# Patient Record
Sex: FEMALE | Race: WHITE | NOT HISPANIC OR LATINO
[De-identification: names, ages, dates, MRNs, and addresses within clinical notes are randomized per-mention and may not be internally consistent; named-entity substitution may affect disease eponyms.]

---

## 2020-10-05 ENCOUNTER — RECORDS - HEALTHEAST (OUTPATIENT)
Dept: ADMINISTRATIVE | Facility: OTHER | Age: 75
End: 2020-10-05

## 2020-10-07 ENCOUNTER — HOSPITAL ENCOUNTER (OUTPATIENT)
Dept: ULTRASOUND IMAGING | Facility: CLINIC | Age: 75
Discharge: HOME | End: 2020-10-07
Attending: PHYSICIAN ASSISTANT

## 2020-10-07 DIAGNOSIS — M79.89 RIGHT LEG SWELLING: ICD-10-CM

## 2021-03-25 ENCOUNTER — RECORDS - HEALTHEAST (OUTPATIENT)
Dept: ADMINISTRATIVE | Facility: OTHER | Age: 76
End: 2021-03-25

## 2021-04-08 ENCOUNTER — HOSPITAL ENCOUNTER (OUTPATIENT)
Dept: CT IMAGING | Facility: CLINIC | Age: 76
Discharge: HOME | End: 2021-04-08
Attending: INTERNAL MEDICINE

## 2021-04-08 DIAGNOSIS — R10.9 ABDOMINAL PAIN: ICD-10-CM

## 2021-04-08 LAB
CREAT BLD-MCNC: 1.2 MG/DL (ref 0.6–1.1)
GFR SERPL CREATININE-BSD FRML MDRD: 44 ML/MIN/1.73M2

## 2021-04-16 ENCOUNTER — RECORDS - HEALTHEAST (OUTPATIENT)
Dept: ADMINISTRATIVE | Facility: OTHER | Age: 76
End: 2021-04-16

## 2021-04-19 ENCOUNTER — HOSPITAL ENCOUNTER (OUTPATIENT)
Dept: ULTRASOUND IMAGING | Facility: CLINIC | Age: 76
Discharge: HOME | End: 2021-04-19

## 2021-04-19 DIAGNOSIS — N83.8 OVARIAN MASS: ICD-10-CM

## 2021-04-20 ENCOUNTER — HOSPITAL ENCOUNTER (OUTPATIENT)
Dept: MRI IMAGING | Facility: CLINIC | Age: 76
Discharge: HOME | End: 2021-04-20

## 2021-04-20 DIAGNOSIS — R93.5 ABNORMAL CT OF THE ABDOMEN: ICD-10-CM

## 2021-04-20 ASSESSMENT — MIFFLIN-ST. JEOR: SCORE: 1305.08

## 2021-05-15 ENCOUNTER — HEALTH MAINTENANCE LETTER (OUTPATIENT)
Age: 76
End: 2021-05-15

## 2021-05-28 ENCOUNTER — RECORDS - HEALTHEAST (OUTPATIENT)
Dept: ADMINISTRATIVE | Facility: CLINIC | Age: 76
End: 2021-05-28

## 2021-05-31 ENCOUNTER — RECORDS - HEALTHEAST (OUTPATIENT)
Dept: ADMINISTRATIVE | Facility: CLINIC | Age: 76
End: 2021-05-31

## 2021-06-05 VITALS — WEIGHT: 190 LBS | BODY MASS INDEX: 34.96 KG/M2 | HEIGHT: 62 IN

## 2021-09-04 ENCOUNTER — HEALTH MAINTENANCE LETTER (OUTPATIENT)
Age: 76
End: 2021-09-04

## 2022-06-11 ENCOUNTER — HEALTH MAINTENANCE LETTER (OUTPATIENT)
Age: 77
End: 2022-06-11

## 2022-10-22 ENCOUNTER — HEALTH MAINTENANCE LETTER (OUTPATIENT)
Age: 77
End: 2022-10-22

## 2023-06-18 ENCOUNTER — HEALTH MAINTENANCE LETTER (OUTPATIENT)
Age: 78
End: 2023-06-18

## 2024-11-18 RX ORDER — COLCHICINE 0.6 MG/1
0.6 TABLET ORAL DAILY
Status: ON HOLD | COMMUNITY
End: 2025-02-25

## 2024-11-18 RX ORDER — PREDNISOLONE 5 MG/1
5 TABLET ORAL DAILY
Status: ON HOLD | COMMUNITY
End: 2025-02-25

## 2024-11-18 RX ORDER — FUROSEMIDE 20 MG/1
20 TABLET ORAL DAILY
Status: ON HOLD | COMMUNITY
End: 2025-02-25

## 2024-11-18 RX ORDER — ATORVASTATIN CALCIUM 20 MG/1
20 TABLET, FILM COATED ORAL DAILY
COMMUNITY
End: 2025-02-20

## 2024-11-18 RX ORDER — FLUTICASONE PROPIONATE 50 MCG
2 SPRAY, SUSPENSION (ML) NASAL DAILY
Status: ON HOLD | COMMUNITY

## 2024-11-18 RX ORDER — POTASSIUM CHLORIDE 750 MG/1
10 CAPSULE, EXTENDED RELEASE ORAL 3 TIMES DAILY
Status: ON HOLD | COMMUNITY

## 2024-11-18 RX ORDER — PHENAZOPYRIDINE HYDROCHLORIDE 100 MG/1
100 TABLET, FILM COATED ORAL 3 TIMES DAILY PRN
Status: ON HOLD | COMMUNITY
End: 2025-02-25

## 2024-11-18 RX ORDER — NICOTINE POLACRILEX 4 MG/1
20 GUM, CHEWING ORAL 2 TIMES DAILY
Status: ON HOLD | COMMUNITY

## 2024-11-18 RX ORDER — LOSARTAN POTASSIUM AND HYDROCHLOROTHIAZIDE 12.5; 5 MG/1; MG/1
1 TABLET ORAL DAILY
Status: ON HOLD | COMMUNITY

## 2024-11-18 RX ORDER — ALLOPURINOL 100 MG/1
100 TABLET ORAL 2 TIMES DAILY
Status: ON HOLD | COMMUNITY

## 2024-11-18 RX ORDER — ONDANSETRON 8 MG/1
8 TABLET, ORALLY DISINTEGRATING ORAL EVERY 8 HOURS PRN
Status: ON HOLD | COMMUNITY
End: 2025-02-25

## 2024-11-18 RX ORDER — MECLIZINE HYDROCHLORIDE 25 MG/1
25 TABLET ORAL 3 TIMES DAILY PRN
Status: ON HOLD | COMMUNITY

## 2025-02-20 RX ORDER — PREDNISONE 1 MG/1
2 TABLET ORAL DAILY
Status: ON HOLD | COMMUNITY
Start: 2024-12-16

## 2025-02-20 RX ORDER — MULTIVITAMIN
1 TABLET ORAL DAILY
Status: ON HOLD | COMMUNITY

## 2025-02-20 RX ORDER — UBIDECARENONE 75 MG
100 CAPSULE ORAL DAILY
Status: ON HOLD | COMMUNITY
End: 2025-02-25

## 2025-02-21 NOTE — PROVIDER NOTIFICATION
I am evaluating this patient for upcoming Right Total Knee Arthroplasty with Dr. Paris at [facility] on 2/25/25:    - Notified by preop RN that patient was cleared for surgery by PCP at recent preop H&P exam but had mildly elevated WBC of 11.4, mild anemia with hemoglobin of 11.9 and mild hypokalemia with potassium of 3.3.  Patient reports feeling well, denies any recent illness or current signs/symptoms of infection.  Dr. Paris notified of labs above and is okay proceeding.  PCP did instruct patient to increase her potassium supplement dose. We will proceed with surgery on 2/25 as planned. Full Treatment Plan note to follow.  Please call me below with any questions.      CALIN Kern, CNP   Advanced Practice Nurse Navigator- Orthopedics  [facility]   Office Phone: [phone]  Direct Fax: [phone]

## 2025-02-24 ENCOUNTER — ANESTHESIA EVENT (OUTPATIENT)
Dept: SURGERY | Facility: CLINIC | Age: 80
End: 2025-02-24
Payer: COMMERCIAL

## 2025-02-24 NOTE — TREATMENT PLAN
Orthopedic Surgery Pre-Op Plan: Zoey Pereira  pre-op review. This is NOT an H&P   Surgeon: Dr. Paris    [facility]: [facility]  Name of Surgery: Right Total Knee Arthroplasty  Date of Surgery: 2/25/25  H&P: Completed on 2/11/25 by Dr. Linwood Ambriz at [facility].   History of ASA, NSAIDS, vitamin and/or herbal supplements, GLP-1 Agonist or SGLT Inhibitor medication taken within 10 days?: Yes: Takes multivitamin-patient was instructed to hold this for 7 days before surgery.  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 Patient was instructed by Hematology to hold Xarelto 3 days before surgery (take last dose on 2/21/25, then hold). Patient was also instructed to start bridging with enoxaparin (Lovenox) 3 days before surgery, taking last dose by 7 am on 2/24/25, then holding Lovenox for 24 hrs before surgery.     Plan:   1) Discharge Plan: Home morning of POD 1 with assist of Spouse. Please see Discharge Planning section near bottom of this note for further details.     2) Anemia: Mild: Hemoglobin 11.9 at preop exam on 2/11/2025.  Dr. Paris notified and is okay proceeding.  Recommend routine monitoring of postop hemoglobin.    3) Leukocytosis: Mild: WBC 11.4 at preop exam on 2/11/2025.  Patient denies any recent illness for current signs/symptoms of infection.  On low-dose daily prednisone 2 mg daily by Rheumatology. This could explain mildly elevated WBC.  Dr. Paris notified and is okay proceeding.    4) Hypokalemia: Mild: Potassium 3.3 at preop exam on 2/11/2025.  PCP instructed patient to increase daily potassium supplement dose.  We will recheck potassium on day of surgery and can monitor it postop as needed.    5) PONV and sensitivity to narcotic pain medications: reports nausea with anesthesia and with many narcotic/opioid pain medications. Tolerated Dilaudid well in the past with some mild nausea and use  of Zofran.  Recommend the patient discusses this with preop nursing and anesthesia on day of surgery.  Would likely benefit from antiemetics and other measures to prevent or minimize nausea/vomiting.     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  On chronic anticoagulation with rivaroxaban (Xarelto).  Patient was instructed by hematology to hold Xarelto for 3 days before surgery (take last dose on 2/21/2025, then hold).  Hematology also recommended bridging with enoxaparin (Lovenox) starting 3 days before surgery.  Patient was instructed to take last dose of Lovenox by 7 AM on 2/24/2025, then hold Lovenox for 24 hours before surgery.     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 Reno Rouse, [facility], from 1/31/25:  Patient reported not being very active recently due mostly to joint pain issues. Also having some intermittent shortness of breath with activity. Due to this, Dr. Rouse ordered echocardiogram and stress MRI prior to proceeding with right knee surgery. Stress MRI below 2/7/25 showed normal rest and stress perfusion. Echocardiogram on 1/24/25 showed mild-moderate aortic valve regurgitation and mild-moderate aorta dilatation (4.6 cm). After review of this testing Dr. Rouse felt patient was ok to proceed with planned knee surgery and will follow up with him in 1 year with repeat echo at that time.     Cardiac Stress MRI 2/7/25:  1. Stress perfusion MRI: Normal rest and stress perfusion.  Normal calculated myocardial blood flow at peak  stress, 2.4 mL/min/g.  Normal myocardial perfusion reserve, 1.5.  2. LV cavity size is normal. LV wall thickness is normal. LV systolic function is normal. Quantitative LVEF  71%.  - No evidence of myocardial edema.  - Normal native myocardial T1 and calculated myocardial ECV, 24%.  -  Hyperenhancement is normal.  3. RV cavity size is normal. RV systolic function is normal. Quantitative RVEF 54 %.  4. Aortic valve is trileaflet. There is mild central aortic regurgitation with a regurgitant volume 7 mL,  regurgitant fraction of 9%. No significant aortic stenosis.  5. Mildly dilated mid ascending aorta measuring 41 mm.    Echocardiogram 1/24/25:    1.  Normal left ventricular chamber size.  Estimated left ventricular ejection fraction is  55%.   2.  Normal right ventricular chamber size.  Normal right ventricular systolic function.   3.  Mild-moderate aortic valve regurgitation.   4.  Mild-moderate ascending aorta dilatation (4.6 cm).   5.  In comparison with the prior TTE dated 1/2/2024 the ascending aorta now measures 4.6 cm.    Previously measured 4.4 cm.  The aortic   regurgitation is now mild to moderate.  Was previously felt to be moderate.   Estimated EF: 55%    8) Hypercholesterolemia: Unable to tolerate statins due to myalgia.    9) Hypertension: Well-controlled on losartan-hydrochlorothiazide and furosemide.  Patient was instructed to hold losartan-hydrochlorothiazide and furosemide on the morning of surgery.    10) Morbid Obesity: BMI 39.5, Wt: 213 lbs. I recommend continued efforts at safe weight loss following recovery from surgery. If patient is interested in further assistance with weight loss, please consider referral to [facility]. They offer both non-surgical and surgical evidence-based weight loss strategies. Call [phone] to schedule a consultation to learn more.      11) Prediabetes: Hemoglobin A1c 5.7 on 1/27/2025 which is in prediabetes range. We will monitor BG's during hospital stay.  Goal BG <180 to decrease risk for infection and postop wound healing complications.  If BG >180, nursing to please notify Hospitalist.      12) Gout: On allopurinol.    Patient appears medically optimized for upcoming surgery. I would recommend  Hospitalist Consult to assist with medical management. Please call me below with any questions on this patient.       Review of Systems Notable for: Anemia-mild, leukocytosis-mild, hypokalemia-mild, ascending aorta dilatation-mild to moderate, aortic valve regurgitation-mild to moderate, hypercholesterolemia, hypertension, morbid obesity, prediabetes, gout.     Past Medical History:   Past Medical History:   Diagnosis Date    Antiplatelet or antithrombotic long-term use     Arthritis     Complication of anesthesia     Difficulty walking     Gastroesophageal reflux disease     Heart murmur     Hypertension     Obese     PONV (postoperative nausea and vomiting)     Thrombosis     Walking troubles      History reviewed. No pertinent surgical history.    Current Medications:  Patient's Medications   New Prescriptions    No medications on file   Previous Medications    ALLOPURINOL (ZYLOPRIM) 100 MG TABLET    Take 100 mg by mouth 2 times daily.    CALCIUM CARBONATE (CALTRATE 600 PO)    Take by mouth.    COLCHICINE (COLCRYS) 0.6 MG TABLET    Take 0.6 mg by mouth daily.    CYANOCOBALAMIN (VITAMIN B-12) 100 MCG TABLET    Take 100 mcg by mouth daily.    FLUTICASONE (FLONASE) 50 MCG/ACT NASAL SPRAY    Spray 1 spray into both nostrils daily.    FUROSEMIDE (LASIX) 20 MG TABLET    Take 20 mg by mouth daily.    LOSARTAN-HYDROCHLOROTHIAZIDE (HYZAAR) 50-12.5 MG TABLET    Take 1 tablet by mouth daily.    MAGNESIUM OXIDE 400 MG CAPS    Take by mouth.    MECLIZINE (ANTIVERT) 25 MG TABLET    Take 25 mg by mouth 3 times daily as needed for dizziness.    MULTIVITAMIN W/MINERALS (MULTI-VITAMIN) TABLET    Take 1 tablet by mouth daily.    OMEPRAZOLE 20 MG TABLET    Take 20 mg by mouth daily.    ONDANSETRON (ZOFRAN ODT) 8 MG ODT TAB    Take 8 mg by mouth every 8 hours as needed for nausea.    PHENAZOPYRIDINE (PYRIDIUM) 100 MG TABLET    Take 100 mg by mouth 3 times daily as needed for urinary tract discomfort.    POTASSIUM CHLORIDE ER  (MICRO-K) 10 MEQ CR CAPSULE    Take 10 mEq by mouth 2 times daily.    PREDNISOLONE 5 MG TABLET    Take 5 mg by mouth daily.    PREDNISONE (DELTASONE) 1 MG TABLET    Take 1 mg by mouth 2 times daily.    RIVAROXABAN ANTICOAGULANT (XARELTO) 10 MG TABS TABLET    Take by mouth daily (with dinner).   Modified Medications    No medications on file   Discontinued Medications    ATORVASTATIN (LIPITOR) 20 MG TABLET    Take 20 mg by mouth daily.       ALLERGIES:  Allergies   Allergen Reactions    Codeine Dizziness and Difficulty breathing    Alendronate Other (See Comments)     Severe reflux    Hydrocodone Nausea    Mold     Morphine Nausea    Nsaids Other (See Comments)     Pt on xarelton    Oxycodone-Acetaminophen Dizziness and Nausea    Pollen Extract     Pravastatin Muscle Pain (Myalgia)    Short Ragweed Pollen Ext Other (See Comments)     Sneezing, congestion    Simvastatin Muscle Pain (Myalgia)    Sulfa Antibiotics Hives    Adhesive Tape Rash    Conjugated Estrogens Other (See Comments) and Rash     Labia irritation       Social History  Social History     Tobacco Use    Smoking status: Never    Smokeless tobacco: Never   Substance Use Topics    Alcohol use: Not Currently    Drug use: Not Currently       Any Abnormal Recent Diagnostics? Yes  Hemoglobin 11.9 at preop exam on 2/11/2025.  Dr. Paris notified and is okay proceeding.   WBC 11.4 at preop exam on 2/11/2025.  Patient denies any recent illness for current signs/symptoms of infection.  On low-dose daily prednisone 2 mg daily by Rheumatology. This could explain mildly elevated WBC.  Dr. Paris notified and is okay proceeding.  Potassium 3.3 at preop exam on 2/11/2025: Mild hypokalemia. PCP instructed patient to increase daily potassium supplement dose.  We will recheck potassium on day of surgery.     Discharge Planning:     Home morning of POD 1 with assist of Spouse   01/17/25 6982   Discharge Planning   Patient/Family Anticipates Transition to home with family    Living Arrangements   People in Home spouse   Type of Residence Private Residence   Is your private residence a single family home or apartment? Single family home   Stair Railings, Within Home, Primary railings safe and in good condition   Bathroom Shower/Tub Tub/Shower unit   Equipment Currently Used at Home raised toilet seat   Support System   Support Systems Spouse/Significant Other   Do you have someone available to stay with you one or two nights once you are home? Yes       CALIN Kern, CNP   Advanced Practice Nurse Navigator- Orthopedics  [facility]   Phone: [phone]

## 2025-02-25 ENCOUNTER — APPOINTMENT (OUTPATIENT)
Dept: PHYSICAL THERAPY | Facility: CLINIC | Age: 80
DRG: 469 | End: 2025-02-25
Attending: ORTHOPAEDIC SURGERY
Payer: COMMERCIAL

## 2025-02-25 ENCOUNTER — ANESTHESIA (OUTPATIENT)
Dept: SURGERY | Facility: CLINIC | Age: 80
End: 2025-02-25
Payer: COMMERCIAL

## 2025-02-25 ENCOUNTER — APPOINTMENT (OUTPATIENT)
Dept: RADIOLOGY | Facility: CLINIC | Age: 80
DRG: 469 | End: 2025-02-25
Attending: ORTHOPAEDIC SURGERY
Payer: COMMERCIAL

## 2025-02-25 ENCOUNTER — HOSPITAL ENCOUNTER (INPATIENT)
Facility: CLINIC | Age: 80
End: 2025-02-25
Attending: ORTHOPAEDIC SURGERY | Admitting: ORTHOPAEDIC SURGERY
Payer: COMMERCIAL

## 2025-02-25 DIAGNOSIS — Z96.60 STATUS POST JOINT REPLACEMENT: Primary | ICD-10-CM

## 2025-02-25 LAB
CREAT SERPL-MCNC: 0.96 MG/DL (ref 0.51–0.95)
EGFRCR SERPLBLD CKD-EPI 2021: 60 ML/MIN/1.73M2
POTASSIUM SERPL-SCNC: 3.5 MMOL/L (ref 3.4–5.3)

## 2025-02-25 PROCEDURE — 250N000009 HC RX 250: Performed by: ORTHOPAEDIC SURGERY

## 2025-02-25 PROCEDURE — 250N000013 HC RX MED GY IP 250 OP 250 PS 637: Performed by: INTERNAL MEDICINE

## 2025-02-25 PROCEDURE — 99232 SBSQ HOSP IP/OBS MODERATE 35: CPT | Performed by: INTERNAL MEDICINE

## 2025-02-25 PROCEDURE — 97116 GAIT TRAINING THERAPY: CPT | Mod: GP

## 2025-02-25 PROCEDURE — C1776 JOINT DEVICE (IMPLANTABLE): HCPCS | Performed by: ORTHOPAEDIC SURGERY

## 2025-02-25 PROCEDURE — 250N000011 HC RX IP 250 OP 636: Performed by: ANESTHESIOLOGY

## 2025-02-25 PROCEDURE — 250N000009 HC RX 250: Performed by: ANESTHESIOLOGY

## 2025-02-25 PROCEDURE — 999N000065 XR KNEE PORT RIGHT 1/2 VIEWS: Mod: RT

## 2025-02-25 PROCEDURE — 250N000011 HC RX IP 250 OP 636: Performed by: NURSE ANESTHETIST, CERTIFIED REGISTERED

## 2025-02-25 PROCEDURE — 360N000077 HC SURGERY LEVEL 4, PER MIN: Performed by: ORTHOPAEDIC SURGERY

## 2025-02-25 PROCEDURE — 250N000009 HC RX 250: Performed by: NURSE ANESTHETIST, CERTIFIED REGISTERED

## 2025-02-25 PROCEDURE — 250N000013 HC RX MED GY IP 250 OP 250 PS 637: Performed by: ORTHOPAEDIC SURGERY

## 2025-02-25 PROCEDURE — C1713 ANCHOR/SCREW BN/BN,TIS/BN: HCPCS | Performed by: ORTHOPAEDIC SURGERY

## 2025-02-25 PROCEDURE — 250N000011 HC RX IP 250 OP 636: Mod: JW | Performed by: ANESTHESIOLOGY

## 2025-02-25 PROCEDURE — 82565 ASSAY OF CREATININE: CPT | Performed by: ORTHOPAEDIC SURGERY

## 2025-02-25 PROCEDURE — 370N000017 HC ANESTHESIA TECHNICAL FEE, PER MIN: Performed by: ORTHOPAEDIC SURGERY

## 2025-02-25 PROCEDURE — 97530 THERAPEUTIC ACTIVITIES: CPT | Mod: GP

## 2025-02-25 PROCEDURE — 258N000003 HC RX IP 258 OP 636: Performed by: ANESTHESIOLOGY

## 2025-02-25 PROCEDURE — 999N000141 HC STATISTIC PRE-PROCEDURE NURSING ASSESSMENT: Performed by: ORTHOPAEDIC SURGERY

## 2025-02-25 PROCEDURE — 258N000003 HC RX IP 258 OP 636: Performed by: NURSE ANESTHETIST, CERTIFIED REGISTERED

## 2025-02-25 PROCEDURE — 84132 ASSAY OF SERUM POTASSIUM: CPT | Performed by: NURSE PRACTITIONER

## 2025-02-25 PROCEDURE — 258N000001 HC RX 258: Performed by: ORTHOPAEDIC SURGERY

## 2025-02-25 PROCEDURE — 250N000011 HC RX IP 250 OP 636: Performed by: ORTHOPAEDIC SURGERY

## 2025-02-25 PROCEDURE — 250N000011 HC RX IP 250 OP 636: Performed by: PHYSICIAN ASSISTANT

## 2025-02-25 PROCEDURE — 250N000013 HC RX MED GY IP 250 OP 250 PS 637: Performed by: PHYSICIAN ASSISTANT

## 2025-02-25 PROCEDURE — 0SRC0J9 REPLACEMENT OF RIGHT KNEE JOINT WITH SYNTHETIC SUBSTITUTE, CEMENTED, OPEN APPROACH: ICD-10-PCS | Performed by: ORTHOPAEDIC SURGERY

## 2025-02-25 PROCEDURE — 97161 PT EVAL LOW COMPLEX 20 MIN: CPT | Mod: GP

## 2025-02-25 PROCEDURE — 710N000010 HC RECOVERY PHASE 1, LEVEL 2, PER MIN: Performed by: ORTHOPAEDIC SURGERY

## 2025-02-25 PROCEDURE — 272N000001 HC OR GENERAL SUPPLY STERILE: Performed by: ORTHOPAEDIC SURGERY

## 2025-02-25 PROCEDURE — 36415 COLL VENOUS BLD VENIPUNCTURE: CPT | Performed by: NURSE PRACTITIONER

## 2025-02-25 DEVICE — PATELLA
Type: IMPLANTABLE DEVICE | Site: KNEE | Status: FUNCTIONAL
Brand: TRIATHLON

## 2025-02-25 DEVICE — TIBIAL BEARING INSERT
Type: IMPLANTABLE DEVICE | Site: KNEE | Status: FUNCTIONAL
Brand: TRIATHLON

## 2025-02-25 DEVICE — PRIMARY TIBIAL BASEPLATE
Type: IMPLANTABLE DEVICE | Site: KNEE | Status: FUNCTIONAL
Brand: TRIATHLON

## 2025-02-25 DEVICE — DISPOSABLE FLUTED HEADLESS PINS
Type: IMPLANTABLE DEVICE | Site: KNEE | Status: FUNCTIONAL
Brand: INSTRUMENT

## 2025-02-25 DEVICE — BONE CEMENT SIMPLEX FULL DOSE 6191-1-001: Type: IMPLANTABLE DEVICE | Site: KNEE | Status: FUNCTIONAL

## 2025-02-25 DEVICE — CRUCIATE RETAINING FEMORAL
Type: IMPLANTABLE DEVICE | Site: KNEE | Status: FUNCTIONAL
Brand: TRIATHLON

## 2025-02-25 RX ORDER — CEFAZOLIN SODIUM/WATER 2 G/20 ML
2 SYRINGE (ML) INTRAVENOUS
Status: COMPLETED | OUTPATIENT
Start: 2025-02-25 | End: 2025-02-25

## 2025-02-25 RX ORDER — EPHEDRINE SULFATE 50 MG/ML
INJECTION, SOLUTION INTRAMUSCULAR; INTRAVENOUS; SUBCUTANEOUS PRN
Status: DISCONTINUED | OUTPATIENT
Start: 2025-02-25 | End: 2025-02-25

## 2025-02-25 RX ORDER — CALCIUM CARBONATE/VITAMIN D3 600 MG-10
1 TABLET ORAL 2 TIMES DAILY
Status: ON HOLD | COMMUNITY

## 2025-02-25 RX ORDER — SODIUM CHLORIDE, SODIUM LACTATE, POTASSIUM CHLORIDE, CALCIUM CHLORIDE 600; 310; 30; 20 MG/100ML; MG/100ML; MG/100ML; MG/100ML
INJECTION, SOLUTION INTRAVENOUS CONTINUOUS
Status: DISCONTINUED | OUTPATIENT
Start: 2025-02-25 | End: 2025-02-25 | Stop reason: HOSPADM

## 2025-02-25 RX ORDER — VITAMIN B COMPLEX
25 TABLET ORAL DAILY
Status: DISPENSED | OUTPATIENT
Start: 2025-02-26

## 2025-02-25 RX ORDER — ONDANSETRON 4 MG/1
4 TABLET, ORALLY DISINTEGRATING ORAL EVERY 6 HOURS PRN
Status: DISPENSED | OUTPATIENT
Start: 2025-02-25

## 2025-02-25 RX ORDER — FENTANYL CITRATE 50 UG/ML
25-100 INJECTION, SOLUTION INTRAMUSCULAR; INTRAVENOUS
Status: DISCONTINUED | OUTPATIENT
Start: 2025-02-25 | End: 2025-02-25 | Stop reason: HOSPADM

## 2025-02-25 RX ORDER — METHOCARBAMOL 500 MG/1
250 TABLET ORAL EVERY 6 HOURS PRN
Status: DISCONTINUED | OUTPATIENT
Start: 2025-02-25 | End: 2025-02-26

## 2025-02-25 RX ORDER — ONDANSETRON 2 MG/ML
4 INJECTION INTRAMUSCULAR; INTRAVENOUS EVERY 30 MIN PRN
Status: DISCONTINUED | OUTPATIENT
Start: 2025-02-25 | End: 2025-02-25 | Stop reason: HOSPADM

## 2025-02-25 RX ORDER — NALOXONE HYDROCHLORIDE 0.4 MG/ML
0.1 INJECTION, SOLUTION INTRAMUSCULAR; INTRAVENOUS; SUBCUTANEOUS
Status: DISCONTINUED | OUTPATIENT
Start: 2025-02-25 | End: 2025-02-25 | Stop reason: HOSPADM

## 2025-02-25 RX ORDER — SODIUM CHLORIDE, SODIUM LACTATE, POTASSIUM CHLORIDE, CALCIUM CHLORIDE 600; 310; 30; 20 MG/100ML; MG/100ML; MG/100ML; MG/100ML
INJECTION, SOLUTION INTRAVENOUS CONTINUOUS
Status: DISCONTINUED | OUTPATIENT
Start: 2025-02-25 | End: 2025-02-26

## 2025-02-25 RX ORDER — AMOXICILLIN 250 MG
1 CAPSULE ORAL 2 TIMES DAILY
Status: DISPENSED | OUTPATIENT
Start: 2025-02-25

## 2025-02-25 RX ORDER — ONDANSETRON 4 MG/1
4 TABLET, ORALLY DISINTEGRATING ORAL EVERY 30 MIN PRN
Status: DISCONTINUED | OUTPATIENT
Start: 2025-02-25 | End: 2025-02-25 | Stop reason: HOSPADM

## 2025-02-25 RX ORDER — POTASSIUM CHLORIDE 750 MG/1
10 CAPSULE, EXTENDED RELEASE ORAL 3 TIMES DAILY
Status: DISCONTINUED | OUTPATIENT
Start: 2025-02-26 | End: 2025-02-25 | Stop reason: HOSPADM

## 2025-02-25 RX ORDER — OXYCODONE HYDROCHLORIDE 5 MG/1
5 TABLET ORAL EVERY 4 HOURS PRN
Status: DISPENSED | OUTPATIENT
Start: 2025-02-25

## 2025-02-25 RX ORDER — PROPOFOL 10 MG/ML
INJECTION, EMULSION INTRAVENOUS CONTINUOUS PRN
Status: DISCONTINUED | OUTPATIENT
Start: 2025-02-25 | End: 2025-02-25

## 2025-02-25 RX ORDER — NYSTATIN 100000 [USP'U]/G
POWDER TOPICAL 3 TIMES DAILY PRN
Status: ON HOLD | COMMUNITY

## 2025-02-25 RX ORDER — TRANEXAMIC ACID 650 MG/1
1950 TABLET ORAL ONCE
Status: COMPLETED | OUTPATIENT
Start: 2025-02-25 | End: 2025-02-25

## 2025-02-25 RX ORDER — CEFADROXIL 500 MG/1
500 CAPSULE ORAL 2 TIMES DAILY
Qty: 28 CAPSULE | Refills: 0 | Status: SHIPPED | OUTPATIENT
Start: 2025-02-25

## 2025-02-25 RX ORDER — DEXAMETHASONE SODIUM PHOSPHATE 4 MG/ML
4 INJECTION, SOLUTION INTRA-ARTICULAR; INTRALESIONAL; INTRAMUSCULAR; INTRAVENOUS; SOFT TISSUE
Status: DISCONTINUED | OUTPATIENT
Start: 2025-02-25 | End: 2025-02-25 | Stop reason: HOSPADM

## 2025-02-25 RX ORDER — POTASSIUM CHLORIDE 750 MG/1
10 TABLET, EXTENDED RELEASE ORAL 3 TIMES DAILY
Status: DISPENSED | OUTPATIENT
Start: 2025-02-25

## 2025-02-25 RX ORDER — PROCHLORPERAZINE MALEATE 5 MG/1
5 TABLET ORAL EVERY 6 HOURS PRN
Status: ACTIVE | OUTPATIENT
Start: 2025-02-25

## 2025-02-25 RX ORDER — ACETAMINOPHEN 500 MG
500-1000 TABLET ORAL EVERY 6 HOURS PRN
Status: ON HOLD | COMMUNITY

## 2025-02-25 RX ORDER — KETOCONAZOLE 20 MG/G
CREAM TOPICAL DAILY PRN
Status: ON HOLD | COMMUNITY

## 2025-02-25 RX ORDER — PANTOPRAZOLE SODIUM 40 MG/1
40 TABLET, DELAYED RELEASE ORAL 2 TIMES DAILY
Status: DISPENSED | OUTPATIENT
Start: 2025-02-25

## 2025-02-25 RX ORDER — LIDOCAINE 40 MG/G
CREAM TOPICAL
Status: ACTIVE | OUTPATIENT
Start: 2025-02-25

## 2025-02-25 RX ORDER — OXYCODONE HYDROCHLORIDE 5 MG/1
5 TABLET ORAL
Status: DISCONTINUED | OUTPATIENT
Start: 2025-02-25 | End: 2025-02-25 | Stop reason: HOSPADM

## 2025-02-25 RX ORDER — OLOPATADINE HYDROCHLORIDE 2 MG/ML
1 SOLUTION/ DROPS OPHTHALMIC DAILY PRN
Status: ON HOLD | COMMUNITY

## 2025-02-25 RX ORDER — ALLOPURINOL 100 MG/1
100 TABLET ORAL 2 TIMES DAILY
Status: DISPENSED | OUTPATIENT
Start: 2025-02-26

## 2025-02-25 RX ORDER — KETOROLAC TROMETHAMINE 30 MG/ML
15 INJECTION, SOLUTION INTRAMUSCULAR; INTRAVENOUS EVERY 6 HOURS
Status: COMPLETED | OUTPATIENT
Start: 2025-02-25 | End: 2025-02-26

## 2025-02-25 RX ORDER — PREDNISONE 1 MG/1
2 TABLET ORAL DAILY
Status: DISPENSED | OUTPATIENT
Start: 2025-02-26

## 2025-02-25 RX ORDER — POLYETHYLENE GLYCOL 3350 17 G/17G
17 POWDER, FOR SOLUTION ORAL DAILY
Status: DISCONTINUED | OUTPATIENT
Start: 2025-02-26 | End: 2025-02-26

## 2025-02-25 RX ORDER — FENTANYL CITRATE 50 UG/ML
50 INJECTION, SOLUTION INTRAMUSCULAR; INTRAVENOUS EVERY 5 MIN PRN
Status: DISCONTINUED | OUTPATIENT
Start: 2025-02-25 | End: 2025-02-25 | Stop reason: HOSPADM

## 2025-02-25 RX ORDER — BUPIVACAINE HYDROCHLORIDE 5 MG/ML
INJECTION, SOLUTION EPIDURAL; INTRACAUDAL PRN
Status: DISCONTINUED | OUTPATIENT
Start: 2025-02-25 | End: 2025-02-25

## 2025-02-25 RX ORDER — HYDROXYZINE HYDROCHLORIDE 10 MG/1
10 TABLET, FILM COATED ORAL EVERY 6 HOURS PRN
Status: DISCONTINUED | OUTPATIENT
Start: 2025-02-25 | End: 2025-02-26

## 2025-02-25 RX ORDER — FLUMAZENIL 0.1 MG/ML
0.2 INJECTION, SOLUTION INTRAVENOUS
Status: DISCONTINUED | OUTPATIENT
Start: 2025-02-25 | End: 2025-02-25 | Stop reason: HOSPADM

## 2025-02-25 RX ORDER — CEFAZOLIN SODIUM/WATER 2 G/20 ML
2 SYRINGE (ML) INTRAVENOUS SEE ADMIN INSTRUCTIONS
Status: DISCONTINUED | OUTPATIENT
Start: 2025-02-25 | End: 2025-02-25 | Stop reason: HOSPADM

## 2025-02-25 RX ORDER — SCOPOLAMINE 1 MG/3D
1 PATCH, EXTENDED RELEASE TRANSDERMAL ONCE
Status: DISPENSED | OUTPATIENT
Start: 2025-02-25

## 2025-02-25 RX ORDER — CEFAZOLIN SODIUM 2 G/100ML
2 INJECTION, SOLUTION INTRAVENOUS EVERY 8 HOURS
Status: COMPLETED | OUTPATIENT
Start: 2025-02-25 | End: 2025-02-25

## 2025-02-25 RX ORDER — VITAMIN B COMPLEX
1 TABLET ORAL DAILY
Status: ON HOLD | COMMUNITY

## 2025-02-25 RX ORDER — ALLOPURINOL 100 MG/1
100 TABLET ORAL 2 TIMES DAILY
Status: DISCONTINUED | OUTPATIENT
Start: 2025-02-26 | End: 2025-02-25 | Stop reason: HOSPADM

## 2025-02-25 RX ORDER — VITAMIN B COMPLEX
25 TABLET ORAL DAILY
Status: DISCONTINUED | OUTPATIENT
Start: 2025-02-26 | End: 2025-02-25 | Stop reason: HOSPADM

## 2025-02-25 RX ORDER — OXYCODONE HYDROCHLORIDE 5 MG/1
10 TABLET ORAL
Status: DISCONTINUED | OUTPATIENT
Start: 2025-02-25 | End: 2025-02-25 | Stop reason: HOSPADM

## 2025-02-25 RX ORDER — CALCIUM CARBONATE/VITAMIN D3 600 MG-10
250 TABLET ORAL DAILY
Status: DISCONTINUED | OUTPATIENT
Start: 2025-02-26 | End: 2025-02-25 | Stop reason: HOSPADM

## 2025-02-25 RX ORDER — CALCIUM CARBONATE/VITAMIN D3 600 MG-10
250 TABLET ORAL DAILY
Status: DISPENSED | OUTPATIENT
Start: 2025-02-26

## 2025-02-25 RX ORDER — HYDROMORPHONE HCL IN WATER/PF 6 MG/30 ML
0.2 PATIENT CONTROLLED ANALGESIA SYRINGE INTRAVENOUS EVERY 4 HOURS PRN
Status: DISCONTINUED | OUTPATIENT
Start: 2025-02-25 | End: 2025-02-27

## 2025-02-25 RX ORDER — PREDNISONE 1 MG/1
2 TABLET ORAL DAILY
Status: DISCONTINUED | OUTPATIENT
Start: 2025-02-26 | End: 2025-02-25 | Stop reason: HOSPADM

## 2025-02-25 RX ORDER — LOSARTAN POTASSIUM AND HYDROCHLOROTHIAZIDE 12.5; 5 MG/1; MG/1
1 TABLET ORAL DAILY
Status: DISPENSED | OUTPATIENT
Start: 2025-02-26

## 2025-02-25 RX ORDER — NICOTINE POLACRILEX 4 MG/1
20 GUM, CHEWING ORAL 2 TIMES DAILY
Status: DISCONTINUED | OUTPATIENT
Start: 2025-02-26 | End: 2025-02-25 | Stop reason: HOSPADM

## 2025-02-25 RX ORDER — MAGNESIUM HYDROXIDE 1200 MG/15ML
LIQUID ORAL PRN
Status: DISCONTINUED | OUTPATIENT
Start: 2025-02-25 | End: 2025-02-25 | Stop reason: HOSPADM

## 2025-02-25 RX ORDER — FENTANYL CITRATE 50 UG/ML
25 INJECTION, SOLUTION INTRAMUSCULAR; INTRAVENOUS EVERY 5 MIN PRN
Status: DISCONTINUED | OUTPATIENT
Start: 2025-02-25 | End: 2025-02-25 | Stop reason: HOSPADM

## 2025-02-25 RX ORDER — ACETAMINOPHEN 325 MG/1
975 TABLET ORAL EVERY 8 HOURS
Status: DISCONTINUED | OUTPATIENT
Start: 2025-02-25 | End: 2025-02-27

## 2025-02-25 RX ORDER — ONDANSETRON 2 MG/ML
4 INJECTION INTRAMUSCULAR; INTRAVENOUS EVERY 6 HOURS PRN
Status: DISPENSED | OUTPATIENT
Start: 2025-02-25

## 2025-02-25 RX ORDER — ONDANSETRON 8 MG/1
8 TABLET, FILM COATED ORAL EVERY 8 HOURS PRN
Status: ON HOLD | COMMUNITY

## 2025-02-25 RX ORDER — CALCIUM CARBONATE/VITAMIN D3 600 MG-10
1 TABLET ORAL 2 TIMES DAILY
Status: DISCONTINUED | OUTPATIENT
Start: 2025-02-26 | End: 2025-02-25 | Stop reason: HOSPADM

## 2025-02-25 RX ORDER — BUPIVACAINE HYDROCHLORIDE 7.5 MG/ML
INJECTION, SOLUTION INTRASPINAL PRN
Status: DISCONTINUED | OUTPATIENT
Start: 2025-02-25 | End: 2025-02-25

## 2025-02-25 RX ORDER — HYDROMORPHONE HCL IN WATER/PF 6 MG/30 ML
0.1 PATIENT CONTROLLED ANALGESIA SYRINGE INTRAVENOUS EVERY 4 HOURS PRN
Status: DISCONTINUED | OUTPATIENT
Start: 2025-02-25 | End: 2025-02-27

## 2025-02-25 RX ORDER — HYDROMORPHONE HCL IN WATER/PF 6 MG/30 ML
0.2 PATIENT CONTROLLED ANALGESIA SYRINGE INTRAVENOUS EVERY 5 MIN PRN
Status: DISCONTINUED | OUTPATIENT
Start: 2025-02-25 | End: 2025-02-25 | Stop reason: HOSPADM

## 2025-02-25 RX ORDER — BISACODYL 10 MG
10 SUPPOSITORY, RECTAL RECTAL DAILY PRN
Status: ACTIVE | OUTPATIENT
Start: 2025-02-25

## 2025-02-25 RX ORDER — AMOXICILLIN 250 MG
1 CAPSULE ORAL 2 TIMES DAILY PRN
Status: ON HOLD | COMMUNITY

## 2025-02-25 RX ORDER — HYDROMORPHONE HCL IN WATER/PF 6 MG/30 ML
0.4 PATIENT CONTROLLED ANALGESIA SYRINGE INTRAVENOUS EVERY 5 MIN PRN
Status: DISCONTINUED | OUTPATIENT
Start: 2025-02-25 | End: 2025-02-25 | Stop reason: HOSPADM

## 2025-02-25 RX ORDER — LIDOCAINE 40 MG/G
CREAM TOPICAL
Status: DISCONTINUED | OUTPATIENT
Start: 2025-02-25 | End: 2025-02-25 | Stop reason: HOSPADM

## 2025-02-25 RX ORDER — PROPOFOL 10 MG/ML
INJECTION, EMULSION INTRAVENOUS PRN
Status: DISCONTINUED | OUTPATIENT
Start: 2025-02-25 | End: 2025-02-25

## 2025-02-25 RX ORDER — ENOXAPARIN SODIUM 100 MG/ML
40 INJECTION SUBCUTANEOUS DAILY
Status: ON HOLD | COMMUNITY
End: 2025-02-26

## 2025-02-25 RX ORDER — SODIUM CHLORIDE, SODIUM LACTATE, POTASSIUM CHLORIDE, CALCIUM CHLORIDE 600; 310; 30; 20 MG/100ML; MG/100ML; MG/100ML; MG/100ML
INJECTION, SOLUTION INTRAVENOUS CONTINUOUS PRN
Status: DISCONTINUED | OUTPATIENT
Start: 2025-02-25 | End: 2025-02-25

## 2025-02-25 RX ORDER — ONDANSETRON 2 MG/ML
INJECTION INTRAMUSCULAR; INTRAVENOUS PRN
Status: DISCONTINUED | OUTPATIENT
Start: 2025-02-25 | End: 2025-02-25

## 2025-02-25 RX ORDER — LOSARTAN POTASSIUM AND HYDROCHLOROTHIAZIDE 12.5; 5 MG/1; MG/1
1 TABLET ORAL DAILY
Status: DISCONTINUED | OUTPATIENT
Start: 2025-02-26 | End: 2025-02-25 | Stop reason: HOSPADM

## 2025-02-25 RX ADMIN — POTASSIUM CHLORIDE 10 MEQ: 750 TABLET, EXTENDED RELEASE ORAL at 14:02

## 2025-02-25 RX ADMIN — Medication 2 G: at 07:12

## 2025-02-25 RX ADMIN — KETOROLAC TROMETHAMINE 15 MG: 30 INJECTION, SOLUTION INTRAMUSCULAR at 15:31

## 2025-02-25 RX ADMIN — OXYCODONE 5 MG: 5 TABLET ORAL at 16:52

## 2025-02-25 RX ADMIN — BUPIVACAINE HYDROCHLORIDE 15 ML: 5 INJECTION, SOLUTION EPIDURAL; INTRACAUDAL; PERINEURAL at 06:54

## 2025-02-25 RX ADMIN — RIVAROXABAN 10 MG: 10 TABLET, FILM COATED ORAL at 18:50

## 2025-02-25 RX ADMIN — DEXMEDETOMIDINE HYDROCHLORIDE 4 MCG: 100 INJECTION, SOLUTION INTRAVENOUS at 07:18

## 2025-02-25 RX ADMIN — PHENYLEPHRINE HYDROCHLORIDE 0.2 MCG/KG/MIN: 10 INJECTION INTRAVENOUS at 07:27

## 2025-02-25 RX ADMIN — DEXMEDETOMIDINE HYDROCHLORIDE 4 MCG: 100 INJECTION, SOLUTION INTRAVENOUS at 07:35

## 2025-02-25 RX ADMIN — ACETAMINOPHEN 975 MG: 325 TABLET ORAL at 10:07

## 2025-02-25 RX ADMIN — KETOROLAC TROMETHAMINE 15 MG: 30 INJECTION, SOLUTION INTRAMUSCULAR at 10:06

## 2025-02-25 RX ADMIN — Medication 2.5 MG: at 08:01

## 2025-02-25 RX ADMIN — METHOCARBAMOL 250 MG: 500 TABLET ORAL at 12:32

## 2025-02-25 RX ADMIN — PROPOFOL 130 MCG/KG/MIN: 10 INJECTION, EMULSION INTRAVENOUS at 07:21

## 2025-02-25 RX ADMIN — SCOPOLAMINE 1 PATCH: 1 SYSTEM TRANSDERMAL at 06:59

## 2025-02-25 RX ADMIN — CEFAZOLIN SODIUM 2 G: 2 INJECTION, SOLUTION INTRAVENOUS at 22:15

## 2025-02-25 RX ADMIN — SODIUM CHLORIDE, POTASSIUM CHLORIDE, SODIUM LACTATE AND CALCIUM CHLORIDE: 600; 310; 30; 20 INJECTION, SOLUTION INTRAVENOUS at 07:45

## 2025-02-25 RX ADMIN — ONDANSETRON 4 MG: 2 INJECTION INTRAMUSCULAR; INTRAVENOUS at 08:35

## 2025-02-25 RX ADMIN — ACETAMINOPHEN 975 MG: 325 TABLET ORAL at 18:50

## 2025-02-25 RX ADMIN — METHOCARBAMOL 250 MG: 500 TABLET ORAL at 20:40

## 2025-02-25 RX ADMIN — PROPOFOL 10 MG: 10 INJECTION, EMULSION INTRAVENOUS at 07:21

## 2025-02-25 RX ADMIN — SENNOSIDES AND DOCUSATE SODIUM 1 TABLET: 50; 8.6 TABLET ORAL at 12:04

## 2025-02-25 RX ADMIN — PANTOPRAZOLE SODIUM 40 MG: 40 TABLET, DELAYED RELEASE ORAL at 20:00

## 2025-02-25 RX ADMIN — KETOROLAC TROMETHAMINE 15 MG: 30 INJECTION, SOLUTION INTRAMUSCULAR at 22:15

## 2025-02-25 RX ADMIN — BUPIVACAINE HYDROCHLORIDE IN DEXTROSE 1.6 ML: 7.5 INJECTION, SOLUTION SUBARACHNOID at 07:18

## 2025-02-25 RX ADMIN — DEXMEDETOMIDINE HYDROCHLORIDE 4 MCG: 100 INJECTION, SOLUTION INTRAVENOUS at 07:25

## 2025-02-25 RX ADMIN — SENNOSIDES AND DOCUSATE SODIUM 1 TABLET: 50; 8.6 TABLET ORAL at 20:00

## 2025-02-25 RX ADMIN — ONDANSETRON 4 MG: 2 INJECTION, SOLUTION INTRAMUSCULAR; INTRAVENOUS at 11:17

## 2025-02-25 RX ADMIN — OXYCODONE HYDROCHLORIDE 2.5 MG: 5 TABLET ORAL at 12:04

## 2025-02-25 RX ADMIN — SODIUM CHLORIDE, SODIUM LACTATE, POTASSIUM CHLORIDE, AND CALCIUM CHLORIDE: .6; .31; .03; .02 INJECTION, SOLUTION INTRAVENOUS at 06:32

## 2025-02-25 RX ADMIN — POTASSIUM CHLORIDE 10 MEQ: 750 TABLET, EXTENDED RELEASE ORAL at 20:00

## 2025-02-25 RX ADMIN — OXYCODONE HYDROCHLORIDE 2.5 MG: 5 TABLET ORAL at 11:17

## 2025-02-25 RX ADMIN — TRANEXAMIC ACID 1950 MG: 650 TABLET ORAL at 06:15

## 2025-02-25 RX ADMIN — CEFAZOLIN SODIUM 2 G: 2 INJECTION, SOLUTION INTRAVENOUS at 14:02

## 2025-02-25 RX ADMIN — SODIUM CHLORIDE, POTASSIUM CHLORIDE, SODIUM LACTATE AND CALCIUM CHLORIDE: 600; 310; 30; 20 INJECTION, SOLUTION INTRAVENOUS at 06:30

## 2025-02-25 RX ADMIN — MIDAZOLAM HYDROCHLORIDE 2 MG: 1 INJECTION, SOLUTION INTRAMUSCULAR; INTRAVENOUS at 06:52

## 2025-02-25 ASSESSMENT — ACTIVITIES OF DAILY LIVING (ADL)
ADLS_ACUITY_SCORE: 34
ADLS_ACUITY_SCORE: 31
ADLS_ACUITY_SCORE: 38
ADLS_ACUITY_SCORE: 31
ADLS_ACUITY_SCORE: 35
ADLS_ACUITY_SCORE: 31
ADLS_ACUITY_SCORE: 31
ADLS_ACUITY_SCORE: 34
ADLS_ACUITY_SCORE: 31
ADLS_ACUITY_SCORE: 31
ADLS_ACUITY_SCORE: 34
ADLS_ACUITY_SCORE: 29
ADLS_ACUITY_SCORE: 35
ADLS_ACUITY_SCORE: 31

## 2025-02-25 NOTE — ANESTHESIA PROCEDURE NOTES
"Intrathecal injection Procedure Note    Pre-Procedure   Staff -        Anesthesiologist:  Mitchell Desir MD       Performed By: anesthesiologist       Location: OR       Procedure Start/Stop Times: 2/25/2025 7:15 AM and 2/25/2025 7:18 AM       Pre-Anesthestic Checklist: patient identified, IV checked, risks and benefits discussed, informed consent, monitors and equipment checked, pre-op evaluation and at physician/surgeon's request  Timeout:       Correct Patient: Yes        Correct Procedure: Yes        Correct Site: Yes        Correct Position: Yes   Procedure Documentation  Procedure: intrathecal injection         Patient Position: sitting       Skin prep: Chloraprep       Insertion Site: L4-5. (right paramedian approach).       Needle Gauge: 24.        Needle Length (Inches): 3.5        Spinal Needle Type: Pencan       Introducer used       # of attempts: 1 and  # of redirects:     Assessment/Narrative         Paresthesias: No.       CSF fluid: clear.    Medication(s) Administered   Medication Administration Time: 2/25/2025 7:15 AM      FOR [facility] ([facility]) ONLY:   Pain Team Contact information: please page the Pain Team Via [employer]. Search \"Pain\". During daytime hours, please page the attending first. At night please page the resident first.      "

## 2025-02-25 NOTE — OP NOTE
Operative Report    PATIENT Zoey Pereira   DATE OF SURGERY:  2/25/2025    PREOPERATIVE DIAGNOSIS   Primary osteoarthritis of right knee [M17.11].    POSTOPERATIVE DIAGNOSIS   Primary osteoarthritis of right knee [M17.11].    PROCEDURE PERFORMED   RIGHT total knee arthroplasty    IMPLANTS  Implant Name Type Inv. Item Serial No.  Lot No. LRB No. Used Action   PIN FIXATION SS FLUTE SQUARE L3.5 IN OD1/8 IN [id number] - [id number] Metallic Hardware/[place] PIN FIXATION SS FLUTE SQUARE L3.5 IN OD1/8 IN [id number]  [employer] [id number] Right 1 Used as a Supply   BONE CEMENT SIMPLEX FULL DOSE 6191-1-001 - [id number] Cement, Bone BONE CEMENT SIMPLEX FULL DOSE 6191-1-001  [employer] [id number] Right 1 Implanted   IMP COMP PATELLA HOWM ASYM TRI [id number] 5551-L-320 - [id number] Total Joint Component/Insert IMP COMP PATELLA HOWM ASYM TRI [id number] 5551-L-320  [employer] [id number] Right 1 Implanted   IMP COMP FEM STRK TRIATHLN CR RT 2 5510-F-202 - [id number] Total Joint Component/Insert IMP COMP FEM STRK TRIATHLN CR RT 2 5510-F-202  [employer] [id number] Right 1 Implanted   IMP BASEPLATE TIBIAL HOWM TRI 2 5520-B-200 - [id number] Total Joint Component/Insert IMP BASEPLATE TIBIAL HOWM TRI 2 5520-B-200  [employer] [id number] Right 1 Implanted   INSERT TIB 2 9MM KN PE CNDRL STAB BRNG TRTHLN STRL LF - [id number] Total Joint Component/Insert INSERT TIB 2 9MM KN PE CNDRL STAB BRNG TRTHLN STRL LF  [employer] [id number] Right 1 Implanted       SURGEON  James Paris MD     ASSISTANT   Akilah Feng PA-C; assistant was required for patient positioning, surgical assistance, wound closure and monitoring patient's safety throughout the case.    ANESTHESIA  Choice      FINDINGS:  Full thickness tricompartmental cartilage loss.     SPECIMENS:  none    ESTIMATED BLOOD LOSS:  50 cc    COMPLICATIONS   None.      INDICATION FOR PROCEDURE  Zoey Pereira is a 79 year old female who I evaluated in my clinic  for severe RIGHT knee pain.  X-rays confirmed end-stage osteoarthritis.  All conservative treatments were exhausted including: Activity modification, NSAIDs and intra-articular injections. These no longer provided symptom relief.  The patient's quality of life and activities of daily living were being greatly affected.  Due to this, the patient was indicated for total knee arthroplasty.      INFORMED CONSENT  Zoey Pereira was identified in the preoperative holding area and was identified using medical record number, name, and date of birth, all of which were confirmed. The operative extremity was marked using an indelible marker. Once again, the risks, benefits and expected outcomes of the procedure were discussed in full.  This discussion included, but was not limited to: Bleeding, nerve/vascular injury, fracture, instability, implant complications, continued pain, stiffness, scarring/incision numbness, infection, anesthetic/medical complications, death.  After this discussion patient elected to proceed with procedure, and did sign informed consent.    DESCRIPTION OF PROCEDURE   Zoey Pereira received a regional block in the preoperative holding area.  They were then brought back to the operating room and placed on the operating table in the supine position.  All bony prominences were well-padded.  A well-padded tourniquet was placed high on the operative thigh. The operative leg was then sterilely prepped and draped in the usual fashion with ChloraPrep.     A timeout was performed prior to the start of the procedure.  This confirmed the patient's name, correct site and side of surgery, and the procedure being performed.  Allergies were also confirmed.  All necessary implants were confirmed and available.  Administration of IV antibiotics and TXA were confirmed. Three independent staff members agreed with the information discussed in the timeout.     The leg was exsanguinated and the tourniquet was inflated.  A  midline approach to the knee was utilized.  Sharp dissection was performed down to the level of the capsule. Medial and lateral skin flaps were raised.  A medial parapatellar arthrotomy was performed. The anterior horn of the medial meniscus was resected and a medial release was performed around the tibial plateau. Medial osteophytes were resected.  The knee was extended and patellar fat pad was excised being careful to protect the patellar tendon.      Attention was turned back to the femur.  The ACL was sacrificed. This the opening drill was utilized to open the femoral canal.  The flexible intramedullary guide was then placed.  The distal femoral cutting jig was pinned into place in 5 degrees of valgus with a depth of 8 mm.  Intramedullary guide was then removed and distal femoral cut was then made.  This was ensured to be flat.  The pins were left in place and attention was turned to the tibia.     With the knee flexed and retractors placed to protect ligaments and vascular structures, the extramedullary tibial guide was placed.   The slope was checked and the distal aspect was centered on the ankle.  The jig was pinned, and depth of resection was verified.  The proximal tibial resection was performed using an oscillating saw being careful to protect the bone block, PCL and posterior structures.  The jig was removed with the pins were left in place.  At this point, the knee was brought down to full extension and a 9 mm spacer block was placed.  Checked the medial and lateral gaps which were found to be acceptable after medial release and osteophyte resection.  The 2 pins in the tibia and the 2 pins in the femur were then removed.     Attention was turned back to the distal femur.  The knee was flexed to 90 degrees.  Using the [employer] tensioner, the rotation of the flexion gap was checked and found to be 3 degrees.  4-in-1 cutting guide was then set to this amount of external rotation.  This was then applied to  the distal femur and the femur was appropriately sized.  The  holes were drilled.  The 4-in-1 cutting guide was then pinned into place.  The posterior cut was made first and again a 9 mm spacer block was utilized to ensure that the flexion space would accept a 9 mm poly.  Once this was done the remainder of the cuts were made.    A laminar  was placed in the lateral side.  The medial meniscus was removed.  The posterior medial osteophytes were removed.  The shoulder was then moved to the medial side and the lateral meniscus was removed.  The posterior lateral osteophytes were also removed.     At this point, we moved to trialing.  A femoral trial was placed.  A tibial baseplate with a 9 mm poly preloaded onto it were then placed onto the tibia and the knee was brought down into extension.  Full extension was obtained.  The medial-lateral balance was acceptable.  Flexion to 130 degrees was obtained.  The medial and lateral balance at 90 degrees of flexion was also found to be acceptable. Drawer exam was also found to be acceptable.     We then everted the patella and placed towel clamps.  This was cut with a freehand technique.   The patella was sized with a caliper, and an oscillating saw was used to make a flush cut.  The size of the patella was determined, and the lugs holes were drilled.  A trial patellar component was then placed onto the patella the patella was reduced and the knee was flexed up ensuring adequate patellar tracking. The lugs were drilled for the femur.      The trial components were then removed.  The proximal tibia was exposed with retractors in place.  The tibial component was appropriately sized and set to the appropriate rotation and then pinned into place.  The tibia was then punched.  The baseplate was then removed.  Local injection cocktail was placed in the soft tissues surrounding the knee.  The bony surfaces were then thoroughly irrigated and dried with a sponge in  preparation for cementing.     The cement was mixed and the components were sequentially cemented.  The tibia was impacted down into place and excess cement was removed.  The final poly was placed into the tibial baseplate and this was reduced back under the femur.  Cement was applied to the distal femur.  Femoral component was then impacted into place and cement was removed.  The knee was brought down into full extension.  Cement was applied to the patella and the patella was gently placed and the excess cement was again removed.  At this point, the cement was allowed to fully cure.    Again the balance both in flexion and extension was found to be adequate.  Full extension was obtained.  130 degrees of flexion was obtained.     The wound was closed in layers with a #2 vicryl and #1 Stratafix for the capsular closure, 2-0 vicryl, and 3-0 monocryl, followed by skin glue. The aquacel dressing was applied, followed by a full leg ACE wrap.      The patient was then transferred to the postoperative bed, awoken from anesthesia and taken to recovery in stable condition.  There were no complications.  The patient tolerated the procedure well.    POSTOPERATIVE PLAN:  -Pain control  -PT/OT, WBAT  -24 hours IV antibiotics  -Resume home Xarelto tonight  -X-rays to be completed in PACU    James Paris MD  [facility]

## 2025-02-25 NOTE — ANESTHESIA PREPROCEDURE EVALUATION
Anesthesia Pre-Procedure Evaluation    Patient: Zoey Pereira   MRN: [id number] : 1945        Procedure : Procedure(s):  RIGHT TOTAL KNEE ARTHROPLASTY          Past Medical History:   Diagnosis Date    Antiplatelet or antithrombotic long-term use     Arthritis     Complication of anesthesia     Difficulty walking     Gastroesophageal reflux disease     Heart disease     Heart murmur     Hypertension     Obese     PONV (postoperative nausea and vomiting)     Thrombosis     Walking troubles       History reviewed. No pertinent surgical history.   Allergies   Allergen Reactions    Codeine Dizziness and Difficulty breathing    Alendronate Other (See Comments)     Severe reflux    Hydrocodone Nausea    Mold     Morphine Nausea    Nsaids Other (See Comments)     Pt on xarelton    Oxycodone-Acetaminophen Dizziness and Nausea    Pollen Extract     Pravastatin Muscle Pain (Myalgia)    Short Ragweed Pollen Ext Other (See Comments)     Sneezing, congestion    Simvastatin Muscle Pain (Myalgia)    Sulfa Antibiotics Hives    Adhesive Tape Rash    Conjugated Estrogens Other (See Comments) and Rash     Labia irritation      Social History     Tobacco Use    Smoking status: Never    Smokeless tobacco: Never   Substance Use Topics    Alcohol use: Not Currently      Wt Readings from Last 1 Encounters:   25 96.6 kg (213 lb)        Anesthesia Evaluation   Pt has had prior anesthetic.     History of anesthetic complications  - PONV.      ROS/MED HX  ENT/Pulmonary:  - neg pulmonary ROS     Neurologic:  - neg neurologic ROS     Cardiovascular:       METS/Exercise Tolerance:     Hematologic:       Musculoskeletal:  - neg musculoskeletal ROS     GI/Hepatic:  - neg GI/hepatic ROS     Renal/Genitourinary:  - neg Renal ROS     Endo:       Psychiatric/Substance Use:  - neg psychiatric ROS     Infectious Disease:  - neg infectious disease ROS     Malignancy:  - neg malignancy ROS     Other:  - neg other ROS          Physical  "Exam    Airway  airway exam normal      Mallampati: II       Respiratory Devices and Support         Dental  no notable dental history         Cardiovascular   cardiovascular exam normal          Pulmonary   pulmonary exam normal                OUTSIDE LABS:  CBC: No results found for: \"WBC\", \"HGB\", \"HCT\", \"PLT\"  BMP:   Lab Results   Component Value Date    POTASSIUM 3.5 02/25/2025    CR 1.2 (H) 04/08/2021     COAGS: No results found for: \"PTT\", \"INR\", \"FIBR\"  POC: No results found for: \"BGM\", \"HCG\", \"HCGS\"  HEPATIC: No results found for: \"ALBUMIN\", \"PROTTOTAL\", \"ALT\", \"AST\", \"GGT\", \"ALKPHOS\", \"BILITOTAL\", \"BILIDIRECT\", \"HOLLY\"  OTHER: No results found for: \"PH\", \"LACT\", \"A1C\", \"FITO\", \"PHOS\", \"MAG\", \"LIPASE\", \"AMYLASE\", \"TSH\", \"T4\", \"T3\", \"CRP\", \"SED\"    Anesthesia Plan    ASA Status:  3       Anesthesia Type: Spinal.              Consents    Anesthesia Plan(s) and associated risks, benefits, and realistic alternatives discussed. Questions answered and patient/representative(s) expressed understanding.     - Discussed:     - Discussed with:  Patient            Postoperative Care    Pain management: Peripheral nerve block (Single Shot).        Comments:               Mitchell Desir MD    I have reviewed the pertinent notes and labs in the chart from the past 30 days and (re)examined the patient.  Any updates or changes from those notes are reflected in this note.    Clinically Significant Risk Factors Present on Admission                # Drug Induced Coagulation Defect: home medication list includes an anticoagulant medication    # Hypertension: Home medication list includes antihypertensive(s)           # Obesity: Estimated body mass index is 38.96 kg/m  as calculated from the following:    Height as of this encounter: 1.575 m (5' 2\").    Weight as of this encounter: 96.6 kg (213 lb).                "

## 2025-02-25 NOTE — PROGRESS NOTES
Patient is holding in PACU bay 10 awaiting inpatient bed placement. Vitals stable on 3L nasal cannula. Reba (spouse) at bedside with patient.

## 2025-02-25 NOTE — ANESTHESIA CARE TRANSFER NOTE
Patient: Zoey Pereira    Procedure: Procedure(s):  RIGHT TOTAL KNEE ARTHROPLASTY       Diagnosis: Primary osteoarthritis of right knee [M17.11]  Diagnosis Additional Information: No value filed.    Anesthesia Type:   Spinal     Note:    Oropharynx: oropharynx clear of all foreign objects and spontaneously breathing  Level of Consciousness: drowsy  Oxygen Supplementation: face mask  Level of Supplemental Oxygen (L/min / FiO2): 8  Independent Airway: airway patency satisfactory and stable  Dentition: dentition unchanged  Vital Signs Stable: post-procedure vital signs reviewed and stable  Report to RN Given: handoff report given  Patient transferred to: PACU  Comments: Pt arouses to name.  Exchanging well. Report given to RN at bedside.   Handoff Report: Identifed the Patient, Identified the Reponsible Provider, Reviewed the pertinent medical history, Discussed the surgical course, Reviewed Intra-OP anesthesia mangement and issues during anesthesia, Set expectations for post-procedure period and Allowed opportunity for questions and acknowledgement of understanding      Vitals:  Vitals Value Taken Time   /59 02/25/25 0848   Temp 36.2  C (97.1  F) 02/25/25 0848   Pulse 85 02/25/25 0848   Resp 14 02/25/25 0848   SpO2 97 % 02/25/25 0848       Electronically Signed By: CALIN Yu CRNA  February 25, 2025  8:51 AM

## 2025-02-25 NOTE — PHARMACY-ADMISSION MEDICATION HISTORY
Pharmacist Admission Medication History    Admission medication history is complete. The information provided in this note is only as accurate as the sources available at the time of the update.    Information Source(s): Patient and CareEverywhere/SureScripts via in-person    Pertinent Information: None    Allergies reviewed with patient and updates made in EHR: yes    Medication History Completed By: Michael Mcclure RP 2/25/2025 6:51 AM    PTA Med List   Medication Sig Note Last Dose/Taking    acetaminophen (TYLENOL) 500 MG tablet Take 500-1,000 mg by mouth every 6 hours as needed for mild pain.  Past Week    allopurinol (ZYLOPRIM) 100 MG tablet Take 100 mg by mouth 2 times daily.  2/25/2025 Morning    calcium carbonate-vitamin D (CALTRATE) 600-10 MG-MCG per tablet Take 1 tablet by mouth 2 times daily.  2/25/2025 Morning    enoxaparin ANTICOAGULANT (LOVENOX) 40 MG/0.4ML syringe Inject 40 mg subcutaneously daily.  2/24/2025 Morning    fluticasone (FLONASE) 50 MCG/ACT nasal spray Spray 2 sprays into both nostrils daily. 2/25/2025: Has with 2/25/2025 Morning    ketoconazole (NIZORAL) 2 % external cream Apply topically daily as needed for itching.  Past Week    losartan-hydrochlorothiazide (HYZAAR) 50-12.5 MG tablet Take 1 tablet by mouth daily.  2/24/2025 Morning    magnesium oxide 400 MG CAPS Take 400 mg by mouth daily.  2/18/2025    meclizine (ANTIVERT) 25 MG tablet Take 25 mg by mouth 3 times daily as needed for dizziness.  Past Week    multivitamin w/minerals (MULTI-VITAMIN) tablet Take 1 tablet by mouth daily.  2/18/2025 Morning    nystatin (MYCOSTATIN) 545600 UNIT/GM external powder Apply topically 3 times daily as needed.  Past Week    olopatadine (PATADAY) 0.2 % ophthalmic solution Place 1 drop into both eyes daily as needed. 2/25/2025: Not with Past Week    omeprazole 20 MG tablet Take 20 mg by mouth 2 times daily.  2/25/2025 Morning    ondansetron (ZOFRAN) 8 MG tablet Take 8 mg by mouth every 8 hours as needed  for nausea.  Past Week    potassium chloride ER (MICRO-K) 10 MEQ CR capsule Take 10 mEq by mouth 3 times daily.  2/25/2025 Morning    predniSONE (DELTASONE) 1 MG tablet Take 2 mg by mouth daily.  2/25/2025 Morning    rivaroxaban ANTICOAGULANT (XARELTO) 10 MG TABS tablet Take by mouth daily (with dinner). 2/11/2025: Pt instructed to take last dose on 2/21/25, then hold before knee surgery. Per pt's hematologist, pt bridging with Lovenox starting 2/22/25, last dose will be on 2/24/25.  2/21/2025 Evening    senna-docusate (SENOKOT-S/PERICOLACE) 8.6-50 MG tablet Take 1 tablet by mouth 2 times daily as needed for constipation.  Past Week    vitamin B-12 (CYANOCOBALAMIN) 250 MCG tablet Take 250 mcg by mouth daily.  2/18/2025 Morning    Vitamin D3 (CHOLECALCIFEROL) 25 mcg (1000 units) tablet Take 1 tablet by mouth daily.  2/18/2025 Morning

## 2025-02-25 NOTE — ANESTHESIA PROCEDURE NOTES
Adductor canal Procedure Note    Pre-Procedure   Staff -        Anesthesiologist:  Mitchell Desir MD       Performed By: anesthesiologist       Location: pre-op       Procedure Start/Stop Times: 2/25/2025 6:50 AM and 2/25/2025 6:55 AM       Pre-Anesthestic Checklist: patient identified, IV checked, site marked, risks and benefits discussed, informed consent, monitors and equipment checked, pre-op evaluation, at physician/surgeon's request and post-op pain management  Timeout:       Correct Patient: Yes        Correct Procedure: Yes        Correct Site: Yes        Correct Position: Yes        Correct Laterality: Yes        Site Marked: Yes  Procedure Documentation  Procedure: Adductor canal         Laterality: right       Patient Position: supine       Skin prep: Chloraprep       Local skin infiltrated with 3 mL of 1% lidocaine.        Needle Type: other       Needle Gauge: 20.        Needle Length (Inches): 6        Ultrasound guided       1. Ultrasound was used to identify targeted nerve, plexus, vascular marker, or fascial plane and place a needle adjacent to it in real-time.       2. Ultrasound was used to visualize the spread of anesthetic in close proximity to the above referenced structure.       3. A permanent image is entered into the patient's record.       4. The visualized anatomic structures appeared normal.       5. There were no apparent abnormal pathologic findings.    Assessment/Narrative         The placement was negative for: blood aspirated, painful injection and site bleeding       Paresthesias: No.       Test dose of 3 mL at.         Test dose negative, 3 minutes after injection, for signs of intravascular, subdural, or intrathecal injection.       Bolus given via needle. no blood aspirated via catheter.        Secured via.        Insertion/Infusion Method: Single Shot       Complications: none       Injection made incrementally with aspirations every 5 mL.    Medication(s) Administered  "  Medication Administration Time: 2/25/2025 6:50 AM      FOR [facility] ONLY:   Pain Team Contact information: please page the Pain Team Via [employer]. Search \"Pain\". During daytime hours, please page the attending first. At night please page the resident first.      "

## 2025-02-25 NOTE — INTERVAL H&P NOTE
"I have reviewed the surgical (or preoperative) H&P that is linked to this encounter, and examined the patient. There are no significant changes    Clinical Conditions Present on Arrival:  Clinically Significant Risk Factors Present on Admission                 # Drug Induced Coagulation Defect: home medication list includes an anticoagulant medication       # Obesity: Estimated body mass index is 38.96 kg/m  as calculated from the following:    Height as of this encounter: 1.575 m (5' 2\").    Weight as of this encounter: 96.6 kg (213 lb).       "

## 2025-02-25 NOTE — PROGRESS NOTES
"[facility]    Medicine Progress Note - Hospitalist Service    Date of Admission:  2/25/2025    Assessment & Plan   #1 s/p elective total right knee replacement (Dr Paris-Mahi)  #2 chronic Xarelto anticoagulation for recurrent DVT (on Lovenox bridge preop)  #3 aortic root dilatation stable  #4 vasculitis by history on low-dose prednisone (2 mg daily)  #5 hypertension continue same medications-med rec completed      Plan will restart Xarelto when okay with Ortho either tonight or tomorrow if things go well probably home tomorrow  Other medications will be continued  She is stable early postop          Diet: Advance Diet as Tolerated: Regular Diet Adult  Discharge Instruction - Regular Diet Adult    DVT Prophylaxis: DOAC  [place] Catheter: Not present  Lines: None     Cardiac Monitoring: None  Code Status: Full Code      Clinically Significant Risk Factors Present on Admission                # Drug Induced Coagulation Defect: home medication list includes an anticoagulant medication    # Hypertension: Home medication list includes antihypertensive(s)           # Obesity: Estimated body mass index is 38.96 kg/m  as calculated from the following:    Height as of this encounter: 1.575 m (5' 2\").    Weight as of this encounter: 96.6 kg (213 lb).              Social Drivers of Health    Physical Activity: Unknown (12/13/2021)    Received from [facility]    Exercise Vital Sign     Days of Exercise per Week: 0 days   Stress: Stress Concern Present (5/23/2023)    Received from [facility]    [employer] - Occupational Stress Questionnaire     Feeling of Stress : To some extent          Disposition Plan     Medically Ready for Discharge: Anticipated Tomorrow             Genaro Siddiqui MD  Hospitalist Service  [facility]  Securely message with [employer] (more info)  Text page via [employer] Paging/Directory "   ______________________________________________________________________    Interval History   {Pertinent overnight events 79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  She sees cardiologist on a regular basis for dilated aortic root which is essentially asymptomatic but she does have a history of vasculitis and is on low-dose prednisone  History of recurrent DVT and on chronic anticoagulation with Xarelto she was on Lovenox bridge preop  She is generally pretty active except for her knee problems sounds like she is going to have to have a left total knee replacement also  History reviewed including recent preop cardiology evaluation including stable echocardiogram    Physical Exam   Vital Signs: Temp: 97.7  F (36.5  C) Temp src: Oral BP: (!) 142/67 Pulse: 79   Resp: 21 SpO2: 96 % O2 Device: Nasal cannula Oxygen Delivery: 1 LPM  Weight: 213 lbs 0 oz  She is seen in the postanesthesia recovery she is quite comfortable alert able to smile and joke her  is also here  Lungs clear  Heart regular   extremities dressing over right knee    ----------------------------------------------------------------------------------------    Medical Decision Making       25 MINUTES SPENT BY ME on the date of service doing chart review, history, exam, documentation & further activities per the note.          Data   Recent Results (from the past 120 hours)   Potassium - Pre-Op    Collection Time: 02/25/25  6:29 AM   Result Value Ref Range    Potassium 3.5 3.4 - 5.3 mmol/L

## 2025-02-25 NOTE — ANESTHESIA POSTPROCEDURE EVALUATION
Patient: Zoey Pereira    Procedure: Procedure(s):  RIGHT TOTAL KNEE ARTHROPLASTY       Anesthesia Type:  Spinal    Note:  Disposition: Inpatient   Postop Pain Control:             Sign Out: Well controlled pain   PONV: No   Neuro/Psych:             Sign Out: Acceptable/Baseline neuro status   Airway/Respiratory:             Sign Out: Acceptable/Baseline resp. status   CV/Hemodynamics:             Sign Out: Acceptable CV status   Other NRE: NONE   DID A NON-ROUTINE EVENT OCCUR?            Last vitals:  Vitals Value Taken Time   /78 02/25/25 1030   Temp 36.1  C (97  F) 02/25/25 0930   Pulse 83 02/25/25 1043   Resp 19 02/25/25 1030   SpO2 94 % 02/25/25 1043   Vitals shown include unfiled device data.    Electronically Signed By: Mitchell Desir MD  February 25, 2025  1:04 PM

## 2025-02-26 ENCOUNTER — APPOINTMENT (OUTPATIENT)
Dept: PHYSICAL THERAPY | Facility: CLINIC | Age: 80
DRG: 469 | End: 2025-02-26
Attending: ORTHOPAEDIC SURGERY
Payer: COMMERCIAL

## 2025-02-26 ENCOUNTER — APPOINTMENT (OUTPATIENT)
Dept: OCCUPATIONAL THERAPY | Facility: CLINIC | Age: 80
DRG: 469 | End: 2025-02-26
Attending: ORTHOPAEDIC SURGERY
Payer: COMMERCIAL

## 2025-02-26 LAB
FASTING STATUS PATIENT QL REPORTED: YES
GLUCOSE SERPL-MCNC: 106 MG/DL (ref 70–99)
HGB BLD-MCNC: 10.2 G/DL (ref 11.7–15.7)
NT-PROBNP SERPL-MCNC: 280 PG/ML (ref 0–1800)

## 2025-02-26 PROCEDURE — 99232 SBSQ HOSP IP/OBS MODERATE 35: CPT | Performed by: STUDENT IN AN ORGANIZED HEALTH CARE EDUCATION/TRAINING PROGRAM

## 2025-02-26 PROCEDURE — 97116 GAIT TRAINING THERAPY: CPT | Mod: GP

## 2025-02-26 PROCEDURE — 97530 THERAPEUTIC ACTIVITIES: CPT | Mod: GP

## 2025-02-26 PROCEDURE — 250N000012 HC RX MED GY IP 250 OP 636 PS 637: Performed by: INTERNAL MEDICINE

## 2025-02-26 PROCEDURE — 250N000011 HC RX IP 250 OP 636: Performed by: ORTHOPAEDIC SURGERY

## 2025-02-26 PROCEDURE — 250N000013 HC RX MED GY IP 250 OP 250 PS 637: Performed by: STUDENT IN AN ORGANIZED HEALTH CARE EDUCATION/TRAINING PROGRAM

## 2025-02-26 PROCEDURE — 83880 ASSAY OF NATRIURETIC PEPTIDE: CPT | Performed by: STUDENT IN AN ORGANIZED HEALTH CARE EDUCATION/TRAINING PROGRAM

## 2025-02-26 PROCEDURE — 97535 SELF CARE MNGMENT TRAINING: CPT | Mod: GO

## 2025-02-26 PROCEDURE — 85018 HEMOGLOBIN: CPT | Performed by: ORTHOPAEDIC SURGERY

## 2025-02-26 PROCEDURE — 250N000013 HC RX MED GY IP 250 OP 250 PS 637: Performed by: ORTHOPAEDIC SURGERY

## 2025-02-26 PROCEDURE — 82947 ASSAY GLUCOSE BLOOD QUANT: CPT | Performed by: ORTHOPAEDIC SURGERY

## 2025-02-26 PROCEDURE — 97166 OT EVAL MOD COMPLEX 45 MIN: CPT | Mod: GO

## 2025-02-26 PROCEDURE — 36415 COLL VENOUS BLD VENIPUNCTURE: CPT | Performed by: ORTHOPAEDIC SURGERY

## 2025-02-26 PROCEDURE — 250N000013 HC RX MED GY IP 250 OP 250 PS 637: Performed by: INTERNAL MEDICINE

## 2025-02-26 RX ORDER — METHOCARBAMOL 500 MG/1
500 TABLET, FILM COATED ORAL 3 TIMES DAILY
Status: DISCONTINUED | OUTPATIENT
Start: 2025-02-26 | End: 2025-02-27

## 2025-02-26 RX ORDER — POLYETHYLENE GLYCOL 3350 17 G/17G
17 POWDER, FOR SOLUTION ORAL
Status: DISPENSED | OUTPATIENT
Start: 2025-02-26

## 2025-02-26 RX ORDER — HYDROXYZINE HYDROCHLORIDE 10 MG/1
10 TABLET, FILM COATED ORAL 3 TIMES DAILY
Status: DISCONTINUED | OUTPATIENT
Start: 2025-02-26 | End: 2025-02-27

## 2025-02-26 RX ADMIN — Medication 1 TABLET: at 08:12

## 2025-02-26 RX ADMIN — Medication 25 MCG: at 08:12

## 2025-02-26 RX ADMIN — Medication 1 TABLET: at 20:41

## 2025-02-26 RX ADMIN — METHOCARBAMOL 500 MG: 500 TABLET ORAL at 09:48

## 2025-02-26 RX ADMIN — KETOROLAC TROMETHAMINE 15 MG: 30 INJECTION, SOLUTION INTRAMUSCULAR at 03:40

## 2025-02-26 RX ADMIN — ALLOPURINOL 100 MG: 100 TABLET ORAL at 08:12

## 2025-02-26 RX ADMIN — HYDROXYZINE HYDROCHLORIDE 10 MG: 10 TABLET, FILM COATED ORAL at 17:21

## 2025-02-26 RX ADMIN — METHOCARBAMOL 500 MG: 500 TABLET ORAL at 13:48

## 2025-02-26 RX ADMIN — POTASSIUM CHLORIDE 10 MEQ: 750 TABLET, EXTENDED RELEASE ORAL at 08:13

## 2025-02-26 RX ADMIN — CYANOCOBALAMIN TAB 250 MCG 250 MCG: 250 TAB at 08:12

## 2025-02-26 RX ADMIN — PREDNISONE 2 MG: 1 TABLET ORAL at 08:12

## 2025-02-26 RX ADMIN — PANTOPRAZOLE SODIUM 40 MG: 40 TABLET, DELAYED RELEASE ORAL at 08:13

## 2025-02-26 RX ADMIN — POLYETHYLENE GLYCOL 3350 17 G: 17 POWDER, FOR SOLUTION ORAL at 08:13

## 2025-02-26 RX ADMIN — ACETAMINOPHEN 975 MG: 325 TABLET ORAL at 17:21

## 2025-02-26 RX ADMIN — OXYCODONE 5 MG: 5 TABLET ORAL at 08:10

## 2025-02-26 RX ADMIN — LOSARTAN POTASSIUM AND HYDROCHLOROTHIAZIDE 1 TABLET: 50; 12.5 TABLET, FILM COATED ORAL at 08:12

## 2025-02-26 RX ADMIN — ACETAMINOPHEN 975 MG: 325 TABLET ORAL at 09:48

## 2025-02-26 RX ADMIN — POTASSIUM CHLORIDE 10 MEQ: 750 TABLET, EXTENDED RELEASE ORAL at 20:41

## 2025-02-26 RX ADMIN — POTASSIUM CHLORIDE 10 MEQ: 750 TABLET, EXTENDED RELEASE ORAL at 13:48

## 2025-02-26 RX ADMIN — OXYCODONE 5 MG: 5 TABLET ORAL at 12:08

## 2025-02-26 RX ADMIN — POLYETHYLENE GLYCOL 3350 17 G: 17 POWDER, FOR SOLUTION ORAL at 17:22

## 2025-02-26 RX ADMIN — SENNOSIDES AND DOCUSATE SODIUM 1 TABLET: 50; 8.6 TABLET ORAL at 08:12

## 2025-02-26 RX ADMIN — HYDROXYZINE HYDROCHLORIDE 10 MG: 10 TABLET, FILM COATED ORAL at 22:10

## 2025-02-26 RX ADMIN — ALLOPURINOL 100 MG: 100 TABLET ORAL at 20:41

## 2025-02-26 RX ADMIN — OXYCODONE 5 MG: 5 TABLET ORAL at 00:22

## 2025-02-26 RX ADMIN — SENNOSIDES AND DOCUSATE SODIUM 1 TABLET: 50; 8.6 TABLET ORAL at 18:53

## 2025-02-26 RX ADMIN — ONDANSETRON 4 MG: 4 TABLET, ORALLY DISINTEGRATING ORAL at 18:53

## 2025-02-26 RX ADMIN — ONDANSETRON 4 MG: 4 TABLET, ORALLY DISINTEGRATING ORAL at 10:54

## 2025-02-26 RX ADMIN — HYDROXYZINE HYDROCHLORIDE 10 MG: 10 TABLET, FILM COATED ORAL at 12:08

## 2025-02-26 RX ADMIN — METHOCARBAMOL 500 MG: 500 TABLET ORAL at 20:41

## 2025-02-26 RX ADMIN — ONDANSETRON 4 MG: 4 TABLET, ORALLY DISINTEGRATING ORAL at 00:22

## 2025-02-26 RX ADMIN — PANTOPRAZOLE SODIUM 40 MG: 40 TABLET, DELAYED RELEASE ORAL at 20:41

## 2025-02-26 RX ADMIN — RIVAROXABAN 10 MG: 10 TABLET, FILM COATED ORAL at 17:21

## 2025-02-26 RX ADMIN — OXYCODONE 5 MG: 5 TABLET ORAL at 18:53

## 2025-02-26 ASSESSMENT — ACTIVITIES OF DAILY LIVING (ADL)
ADLS_ACUITY_SCORE: 53
ADLS_ACUITY_SCORE: 53
ADLS_ACUITY_SCORE: 45
ADLS_ACUITY_SCORE: 45
ADLS_ACUITY_SCORE: 49
ADLS_ACUITY_SCORE: 45
ADLS_ACUITY_SCORE: 53
ADLS_ACUITY_SCORE: 49
ADLS_ACUITY_SCORE: 49
ADLS_ACUITY_SCORE: 45
ADLS_ACUITY_SCORE: 49
ADLS_ACUITY_SCORE: 49
ADLS_ACUITY_SCORE: 52
ADLS_ACUITY_SCORE: 49
ADLS_ACUITY_SCORE: 45
ADLS_ACUITY_SCORE: 45
ADLS_ACUITY_SCORE: 50
ADLS_ACUITY_SCORE: 49

## 2025-02-26 NOTE — PROGRESS NOTES
"[facility] MEDICINE  PROGRESS NOTE       Securely message me with [employer] (more info)    Code Status: Full Code  Procedure(s):  RIGHT TOTAL KNEE ARTHROPLASTY  1 Day Post-Op    Assessment and Plan:    #1 s/p elective total right knee replacement (Dr Paris-Mahi)  #2 chronic Xarelto anticoagulation for recurrent DVT (on Lovenox bridge preop)  #3 aortic root dilatation stable  #4 vasculitis by history on low-dose prednisone (2 mg daily)  #5 hypertension continue same medications-med rec completed  #6 acute hypoxic respiratory failure   Likely from atelectasis, advised to do IS intermittently, discontinued IVF, add BNP  #7 constipation increased miralax    Anticoagulation   Orders (Includes Only Anticoagulants)  rivaroxaban ANTICOAGULANT, 10 mg, Oral, Daily with supper          Clinically Significant Risk Factors Present on Admission                # Drug Induced Coagulation Defect: home medication list includes an anticoagulant medication    # Hypertension: Home medication list includes antihypertensive(s)      # Anemia: based on hgb <11       # Obesity: Estimated body mass index is 38.96 kg/m  as calculated from the following:    Height as of this encounter: 1.575 m (5' 2\").    Weight as of this encounter: 96.6 kg (213 lb).              Interval History/Subjective:  She is not comfortable, mostly from the knee and bed. No dyspnea. Constipated.      Last 24H PRN:     ondansetron (ZOFRAN ODT) ODT tab 4 mg, 4 mg at 02/26/25 1054 **OR** ondansetron (ZOFRAN) injection 4 mg, 4 mg at 02/25/25 1117    oxyCODONE IR (ROXICODONE) half-tab 2.5 mg, 2.5 mg at 02/25/25 1204 **OR** oxyCODONE (ROXICODONE) tablet 5 mg, 5 mg at 02/26/25 0810    Physical Exam/Objective:  Temp:  [97.3  F (36.3  C)-97.9  F (36.6  C)] 97.7  F (36.5  C)  Pulse:  [67-81] 78  Resp:  [14-26] 14  BP: (115-134)/(56-65) 117/56  SpO2:  [92 %-95 %] 92 %  Wt Readings from Last 4 Encounters:   02/25/25 96.6 kg (213 lb)     Body mass " index is 38.96 kg/m .    General Appearance: Alert and wake, not in distress  Respiratory: clear lungs, no crackles or wheezing  Cardiovascular: rhythmic, normal S1 and S2, no murmur  GI: soft, non-tender, normal bowel sound  Neurology: oriented x 3  Psych: cooperative and calm, normal affect    Medications:   Personally Reviewed.  Medications   Current Facility-Administered Medications   Medication Dose Route Frequency Provider Last Rate Last Admin    lactated ringers infusion   Intravenous Continuous James Paris MD   Stopped at 02/26/25 0413     Current Facility-Administered Medications   Medication Dose Route Frequency Provider Last Rate Last Admin    acetaminophen (TYLENOL) tablet 975 mg  975 mg Oral Q8H James Paris MD   975 mg at 02/26/25 0948    allopurinol (ZYLOPRIM) tablet 100 mg  100 mg Oral BID Genaro Siddiqui MD   100 mg at 02/26/25 0812    calcium carbonate-vitamin D (OSCAL) 500-5 MG-MCG per tablet 1 tablet  1 tablet Oral BID Genaro Siddiqui MD   1 tablet at 02/26/25 0812    hydrOXYzine HCl (ATARAX) tablet 10 mg  10 mg Oral TID Myrtle Gómez MD        losartan-hydrochlorothiazide (HYZAAR) 50-12.5 MG per tablet 1 tablet  1 tablet Oral Daily Genaro Siddiqui MD   1 tablet at 02/26/25 0812    methocarbamol (ROBAXIN) tablet 500 mg  500 mg Oral TID Myrtle Gómez MD   500 mg at 02/26/25 0948    pantoprazole (PROTONIX) EC tablet 40 mg  40 mg Oral BID Genaro Siddiqui MD   40 mg at 02/26/25 0813    polyethylene glycol (MIRALAX) Packet 17 g  17 g Oral Daily James Paris MD   17 g at 02/26/25 0813    potassium chloride kirsten ER (KLOR-CON M10) CR tablet 10 mEq  10 mEq Oral TID Genaro Siddiqui MD   10 mEq at 02/26/25 0813    predniSONE (DELTASONE) tablet 2 mg  2 mg Oral Daily Genaro Siddiqui MD   2 mg at 02/26/25 0812    rivaroxaban ANTICOAGULANT (XARELTO) tablet 10 mg  10 mg Oral Daily with supper James Paris MD   10 mg at 02/25/25 1850    ROPivacaine (NAROPIN) 5 MG/ mg, EPINEPHrine  (ADRENALIN) 0.6 mg in sodium chloride 0.9 % 100 mL (ORTHO CRISTELA CUSTOM DOSE)   INTRA-ARTICULAR On Call to OR Akilah Feng PA-C        scopolamine (TRANSDERM) 72 hr patch 1 patch  1 patch Transdermal Once Mitchell Desir MD   1 patch at 02/25/25 0659    senna-docusate (SENOKOT-S/PERICOLACE) 8.6-50 MG per tablet 1 tablet  1 tablet Oral BID James Paris MD   1 tablet at 02/26/25 0812    sodium chloride (PF) 0.9% PF flush 3 mL  3 mL Intracatheter Q8H James Paris MD   3 mL at 02/25/25 1119    vitamin B-12 (CYANOCOBALAMIN) tablet 250 mcg  250 mcg Oral Daily Genaro Siddiqui MD   250 mcg at 02/26/25 0812    Vitamin D3 (CHOLECALCIFEROL) tablet 25 mcg  25 mcg Oral Daily Genaro Siddiqui MD   25 mcg at 02/26/25 0812       Data reviewed today: I personally reviewed all new medications, labs, imaging/diagnostics reports over the past 24 hours. Pertinent findings include:    Imaging:   No results found for this or any previous visit (from the past 24 hours).    Labs:  XR Knee Port Right 1/2 Views   Final Result   IMPRESSION:    Postop changes related to interval placement of a right total knee arthroplasty with patellar resurfacing. The components are in the expected position without acute displaced periprosthetic fracture. Expected recent postop soft tissue and intra-articular    gas with knee soft tissue swelling.      POC US Guidance Needle Placement   Final Result        Recent Results (from the past 24 hours)   Hemoglobin    Collection Time: 02/26/25  6:17 AM   Result Value Ref Range    Hemoglobin 10.2 (L) 11.7 - 15.7 g/dL   Glucose    Collection Time: 02/26/25  6:17 AM   Result Value Ref Range    Glucose 106 (H) 70 - 99 mg/dL    Patient Fasting > 8hrs? Yes        Pending Labs:  Unresulted Labs Ordered in the Past 30 Days of this Admission       No orders found for last 31 day(s).                MICHELL WILSON MD  [facility]  Phone: #[phone]    Securely  message me with [employer] (more info)

## 2025-02-26 NOTE — PLAN OF CARE
Pt A&Ox4. VSS on 1L overnight. Pain as high as 7/10, prn and scheduled meds utilized. Dressing CDI. CMS intact, To see PT and OT today with plan to discharge home with .     Patient vital signs are at baseline: No,  Reason:  On 1L O2 overnight  Patient able to ambulate as they were prior to admission or with assist devices provided by therapies during their stay:  Yes  Patient MUST void prior to discharge:  Yes  Patient able to tolerate oral intake:  Yes  Pain has adequate pain control using Oral analgesics:  Yes  Does patient have an identified :  Yes  Has goal D/C date and time been discussed with patient:  Yes   Problem: Adult Inpatient Plan of Care  Goal: Optimal Comfort and Wellbeing  Outcome: Progressing  Intervention: Monitor Pain and Promote Comfort  Recent Flowsheet Documentation  Taken 2/26/2025 0022 by Phyllis Young, RN  Pain Management Interventions:   medication (see MAR)   pain management plan reviewed with patient/caregiver   repositioned     Problem: Knee Arthroplasty  Goal: Optimal Coping  Outcome: Progressing  Intervention: Support Psychosocial Response to Surgery and Mobility Changes  Recent Flowsheet Documentation  Taken 2/26/2025 0022 by Phyllis Young, RN  Supportive Measures:   active listening utilized   goal-setting facilitated   relaxation techniques promoted   verbalization of feelings encouraged  Goal: Effective Urinary Elimination  Outcome: Progressing   Goal Outcome Evaluation:

## 2025-02-26 NOTE — PLAN OF CARE
Came to floor around 1100. A&Ox4. VSS on 2L O2. Up A1 GB and walker. PT eval. Regular diet. C/o right knee pain, improving. PRN Oxycodone given along with Robaxin and scheduled meds. Ice applied. Dressing CDI. CMS intact. LS clear. Denies SOB/CP. Continent, voided via external cath d/t pain but will be getting up to bathroom. IVF infusing. Discharge pending home. Continue to monitor       Goal Outcome Evaluation:  Problem: Knee Arthroplasty  Goal: Optimal Functional Ability  Outcome: Progressing  Intervention: Promote Optimal Functional Status  Recent Flowsheet Documentation  Taken 2/25/2025 1652 by Leticia Espinoza, RN  Assistive Device Utilized:   gait belt   walker  Activity Management:   activity adjusted per tolerance   activity encouraged   ambulated outside room  Taken 2/25/2025 1100 by Leticia Espinoza, RN  Activity Management:   activity adjusted per tolerance   activity encouraged     Problem: Knee Arthroplasty  Goal: Optimal Pain Control and Function  Outcome: Progressing  Intervention: Prevent or Manage Pain  Recent Flowsheet Documentation  Taken 2/25/2025 1735 by Leticia Espinoza, RN  Pain Management Interventions: repositioned  Taken 2/25/2025 1400 by Leticia Espinoza, RN  Pain Management Interventions: cold applied  Taken 2/25/2025 1245 by Leticia Espinoza, RN  Pain Management Interventions: medication (see MAR)  Taken 2/25/2025 1204 by Leticia Espinoza, RN  Pain Management Interventions: medication (see MAR)  Taken 2/25/2025 1100 by Leticia Espinoza, RN  Pain Management Interventions: cold applied     Problem: Knee Arthroplasty  Goal: Effective Urinary Elimination  Outcome: Progressing

## 2025-02-26 NOTE — PROGRESS NOTES
02/25/25 1635   Appointment Info   Signing Clinician's Name / Credentials (PT) Cassandra Herman, PT, DPT   Quick Adds   Quick Adds Certification   Living Environment   People in Home spouse   Current Living Arrangements   (town home)   Home Accessibility stairs to enter home   Number of Stairs, Main Entrance 3   Stair Railings, Main Entrance railing on left side (ascending)   Living Environment Comments pt reports being independent with functional mobility at baseline   Self-Care   Equipment Currently Used at Home   (has cane, 4WW, FWW, shower chair. Typically no device. pt did reports use of walker during gout flare up)   Fall history within last six months no   General Information   Onset of Illness/Injury or Date of Surgery 02/25/25   Referring Physician James Paris MD   Patient/Family Therapy Goals Statement (PT) Return to Home   Pertinent History of Current Problem (include personal factors and/or comorbidities that impact the POC) s/p TKA   Existing Precautions/Restrictions weight bearing   Weight-Bearing Status - RLE weight-bearing as tolerated   Range of Motion (ROM)   ROM Comment decreased ROM s/p TKA   Strength (Manual Muscle Testing)   Strength Comments decreased s/p TKA   Bed Mobility   Bed Mobility supine-sit   Supine-Sit [place] (Bed Mobility) contact guard   Transfers   Transfers sit-stand transfer   Maintains Weight-bearing Status (Transfers) able to maintain   Sit-Stand Transfer   Sit-Stand [place] (Transfers) contact guard   Assistive Device (Sit-Stand Transfers) walker, front-wheeled   Gait/Stairs (Locomotion)   [place] Level (Gait) contact guard   Assistive Device (Gait) walker, front-wheeled   Distance in Feet (Gait) 5   Pattern (Gait) step-through;swing-through   Deviations/Abnormal Patterns (Gait) gait speed decreased;abilio decreased   Maintains Weight-bearing Status (Gait) able to maintain   Comment, (Gait/Stairs) Held stair trial d/t pain   Clinical Impression    Criteria for Skilled Therapeutic Intervention Yes, treatment indicated   PT Diagnosis (PT) Impaired Functional Mobility   Influenced by the following impairments pain, decreased ROM, impaired balance, decreased strength   Functional limitations due to impairments gait, stairs, transfers   Clinical Presentation (PT Evaluation Complexity) stable   Clinical Presentation Rationale pt presents as medically diagnosed   Clinical Decision Making (Complexity) low complexity   Planned Therapy Interventions (PT) balance training;bed mobility training;gait training;home exercise program;patient/family education;ROM (range of motion);stair training;strengthening;transfer training   Risk & Benefits of therapy have been explained care plan/treatment goals reviewed;patient   PT Total Evaluation Time   PT Eval, Low Complexity Minutes (38617) 10   Therapy Certification   Start of care date 02/25/25   Certification date from 02/25/25   Certification date to 03/04/25   Medical Diagnosis s/p TKA   Physical Therapy Goals   PT Frequency Daily   PT Predicted Duration/Target Date for Goal Attainment 03/04/25   PT Goals PT Goal 1;Transfers;Gait;Stairs   PT: Transfers Supervision/stand-by assist;Sit to/from stand;Assistive device;Within precautions   PT: Gait Supervision/stand-by assist;Rolling walker;Within precautions;100 feet   PT: Stairs 3 stairs;Rail on left;Within precautions  (CGA)   PT: Goal 1 pt will be mod I with HEP for LE strengthening and ROM   Interventions   Interventions Quick Adds Therapeutic Procedure;Therapeutic Activity;Gait Training   Therapeutic Procedure/Exercise   Treatment Detail/Skilled Intervention Held HEP d/t pain   Therapeutic Activity   Therapeutic Activities: dynamic activities to improve functional performance Minutes (45817) 14   Treatment Detail/Skilled Intervention Sit to/from stand: cueing for safety/technique/FWW management/hand placement on stable surface, CGA - increased time practice with transfer  training - backing up to surface & hand placement. Completed demo with FWW and rationale for not holding on to FWW; Standing balance: cueing for safety/posture - added challenges lateral weight shifting & marching in place, CGA;  Cueing for deep breathing for pain management throughout session. Education given on precautions & weightbearing status & elevation recommendation. Reviewed/answered pt questions regarding assistive device - recommend use of FWW vs 4WW d/t added stability. Hand off to RN ate end of session. Increased time for room set up for safe mobility & line management. O2 sats WFL with good wave form prior/after activity on RA   Gait Training   Gait Training Minutes (33076) 9   Symptoms Noted During/After Treatment (Gait Training) fatigue   Treatment Detail/Skilled Intervention Cueing for safety/walker management/keeping walker close/posture. standing rest break d/t fatigue/pain - cues for deep breathing.   Distance in Feet 65   [place] Level (Gait Training) contact guard   Physical Assistance Level (Gait Training) verbal cues;supervision   Weight Bearing (Gait Training) weight-bearing as tolerated   Assistive Device (Gait Training) rolling walker   Pattern Analysis (Gait Training) swing-through gait   Gait Analysis Deviations decreased abilio;decreased step length   Impairments (Gait Analysis/Training) balance impaired;pain;ROM decreased;strength decreased   PT Discharge Planning   PT Plan stairs, gait as calixto, HEP   PT Discharge Recommendation (DC Rec) other (see comments)  (defer to ortho)   PT Rationale for DC Rec pt mobilizing well POD 0. No loss of balance in session, limited d/t pain   PT Brief overview of current status CGA gait/transfers   PT Total Distance Amb During Session (feet) 70   PT Equipment Needed at Discharge walker, rolling   Physical Therapy Time and Intention   Timed Code Treatment Minutes 23   Total Session Time (sum of timed and untimed services) 33     [facility]  Rehabilitation Services                                                                                   OUTPATIENT PHYSICAL THERAPY    PLAN OF TREATMENT FOR OUTPATIENT REHABILITATION   Patient's Last Name, First Name, Zoey De Oliveira YOB: 1945   Provider's Name   [facility]   Medical Record No.  [id number]     Onset Date: 02/25/25 Start of Care Date: 02/25/25     Medical Diagnosis:  s/p TKA               PT Diagnosis:  Impaired Functional Mobility Certification Dates:  From: 02/25/25  To: 03/04/25       See note for plan of treatment, functional goals, and certification details.    I CERTIFY THE NEED FOR THESE SERVICES FURNISHED UNDER        THIS PLAN OF TREATMENT AND WHILE UNDER MY CARE (Physician co-signature of this document indicates review and certification of the therapy plan).

## 2025-02-26 NOTE — PROGRESS NOTES
Pt A/O x4. On 3 L NC. VSS. Transferred to room # 310. Accompanied by writer and NA. Pt tolerated transfer. Report given to  NICKIE Baker.

## 2025-02-26 NOTE — PLAN OF CARE
Goal Outcome Evaluation:       Patient vital signs are at baseline: Yes  Patient able to ambulate as they were prior to admission or with assist devices provided by therapies during their stay:  No,  Reason:  Patient rating pain severe with movement.  Patient MUST void prior to discharge:  Yes  Patient able to tolerate oral intake:  Yes  Pain has adequate pain control using Oral analgesics:  No,  Reason:  patient rating pain an 8/10 or above consistently, pain worsens with activity. Scheduled Atarax and Robaxin ordered this shift that brought pain down to 5/10 at rest, but is still rated severe with activity.  Does patient have an identified :  Yes  Has goal D/C date and time been discussed with patient:  Yes     Dressing CDI. Patient had difficult time participating in PT/OT d/t pain, plan to work with PT/OT tomorrow.

## 2025-02-26 NOTE — PROGRESS NOTES
02/26/25 1140   Appointment Info   Signing Clinician's Name / Credentials (OT) Dominic Bourgeois OTR/L   Quick Adds   Quick Adds Certification   Living Environment   People in Home spouse   Current Living Arrangements other (see comments)  ([facility])   Transportation Anticipated family or friend will provide   Living Environment Comments Pt has FWW, 4WW, walkin shower w/ shower chair and grab bars, RTS, reacher   Self-Care   Usual Activity Tolerance good   Current Activity Tolerance poor   Equipment Currently Used at Home grab bar, toilet;grab bar, tub/shower;raised toilet seat   Fall history within last six months no   Activity/Exercise/Self-Care Comment Pt IND w/ ADLs and IADLs at baseline   General Information   Onset of Illness/Injury or Date of Surgery 02/25/25   Referring Physician James Paris MD   Patient/Family Therapy Goal Statement (OT) get pain under control   Additional Occupational Profile Info/Pertinent History of Current Problem R TKA   Existing Precautions/Restrictions no known precautions/restrictions   Right Lower Extremity (Weight-bearing Status) weight-bearing as tolerated (WBAT)   Cognitive Status Examination   Orientation Status orientation to person, place and time   Affect/Mental Status (Cognitive) WNL   Visual Perception   Visual Impairment/Limitations WFL   Sensory   Sensory Quick Adds sensation intact   Pain Assessment   Patient Currently in Pain Yes, see Vital Sign flowsheet   Posture   Posture not impaired   Range of Motion Comprehensive   General Range of Motion no range of motion deficits identified   Strength Comprehensive (MMT)   General Manual Muscle Testing (MMT) Assessment no strength deficits identified   Bed Mobility   Bed Mobility supine-sit;sit-supine   Comment (Bed Mobility) MAx A   Transfers   Transfers bed-chair transfer;sit-stand transfer;toilet transfer   Transfer Comments Max A   Activities of Daily Living   BADL Assessment/Intervention lower body  dressing;bathing;toileting   Bathing Assessment/Intervention   [place] Level (Bathing) maximum assist (25% patient effort)   Lower Body Dressing Assessment/Training   [place] Level (Lower Body Dressing) maximum assist (25% patient effort)   Toileting   [place] Level (Toileting) maximum assist (25% patient effort)   Clinical Impression   Criteria for Skilled Therapeutic Interventions Met (OT) Yes, treatment indicated   OT Diagnosis decreased ADLs   Influenced by the following impairments TKA   OT Problem List-Impairments impacting ADL activity tolerance impaired;pain;mobility   Assessment of Occupational Performance 3-5 Performance Deficits   Identified Performance Deficits LE dressing/bathing, bed mobility, all transfers   Planned Therapy Interventions (OT) ADL retraining;bed mobility training;transfer training   Clinical Decision Making Complexity (OT) detailed assessment/moderate complexity   Risk & Benefits of therapy have been explained evaluation/treatment results reviewed;patient;spouse/significant other   OT Total Evaluation Time   OT Eval, Moderate Complexity Minutes (23325) 10   Therapy Certification   Medical Diagnosis TKA   Start of Care Date 02/26/25   Certification date from 02/26/25   Certification date to 03/05/25   OT Goals   Therapy Frequency (OT) Daily   OT Predicted Duration/Target Date for Goal Attainment 03/05/25   OT Goals Lower Body Dressing;Bed Mobility;Toilet Transfer/Toileting;Transfers   OT: Lower Body Dressing using adaptive equipment;Supervision/stand-by assist   OT: Bed Mobility Modified independent;supine to/from sitting  (w/ leg )   OT: Transfer Supervision/stand-by assist;with assistive device  (walkin shower)   OT: Toilet Transfer/Toileting Supervision/stand-by assist;toilet transfer;cleaning and garment management   Interventions   Interventions Quick Adds Self-Care/Home Management   Self-Care/Home Management   Self-Care/Home Mgmt/ADL, Compensatory, Meal Prep  Minutes (65410) 15   Symptoms Noted During/After Treatment (Meal Preparation/Planning Training) increased pain   Treatment Detail/Skilled Intervention Pt upright in bed and agreeable to therapy - c/o of pain in RLE. Pt instructed on bed mobility techniques using leg  to sit EOB - transferred to sitting EOB w/ Mod A and heavy use of bedrail. Max A for adjusting socks. Pt instructed on safe STS from elevated bed - attempted x3 but pt unable to lift butt off bed w/ Max A due to increased pain. Pt cued to push back into bed and use leg  for sit?supine - completed w/ CGA. Educ pt and spouse on continuing w/ therapy to increase independence w/ ADLs.   OT Discharge Planning   OT Plan bed mobility w/ leg , LE dressing w/ AE, RTS transfer, standing g/h if able, WIS transfer if progressing   OT Discharge Recommendation (DC Rec) other (see comments)  (defer to ortho)   OT Rationale for DC Rec Pt not tolerating much activity due to pain and unable to stand during session. Pt may progress once pain is better controlled. If not progressing, will need TCU.   OT Brief overview of current status CGA-Mod A bed mobility, Max A STS attempts   OT Total Distance Amb During Session (feet) 0   Total Session Time   Timed Code Treatment Minutes 15   Total Session Time (sum of timed and untimed services) 25     Psychiatric                                                                                   OUTPATIENT OCCUPATIONAL THERAPY    PLAN OF TREATMENT FOR OUTPATIENT REHABILITATION   Patient's Last Name, First Name, Zoey De Oliveira YOB: 1945   Provider's Name   Psychiatric   Medical Record No.  [id number]     Onset Date: 02/25/25 Start of Care Date: 02/26/25     Medical Diagnosis:  TKA               OT Diagnosis:  decreased ADLs Certification Dates:  From: 02/26/25  To: 03/05/25     See note for plan of treatment, functional goals, and  certification details.    I CERTIFY THE NEED FOR THESE SERVICES FURNISHED UNDER        THIS PLAN OF TREATMENT AND WHILE UNDER MY CARE (Physician co-signature of this document indicates review and certification of the therapy plan).

## 2025-02-26 NOTE — PLAN OF CARE
Goal Outcome Evaluation:       Patient vital signs are at baseline: No,  Reason:  On 1L O2 via NC   Patient able to ambulate as they were prior to admission or with assist devices provided by therapies during their stay:  Yes, up with assist of 1 with gait belt and walker  Patient MUST void prior to discharge:  Yes  Patient able to tolerate oral intake:  Yes  Pain has adequate pain control using Oral analgesics: Pt having increased pain, utilizing scheduled pain meds, PRN oxycodone, PRN robaxin   Does patient have an identified :  Yes  Has goal D/C date and time been discussed with patient:  Yes, pending therapies and pain control

## 2025-02-26 NOTE — PROGRESS NOTES
SPIRITUAL HEALTH SERVICES Progress Note    Saw pt Zoey Pereira and offered Taoist sacrament of anointing for the healing of the sick.     Fr. Yash Abdul

## 2025-02-26 NOTE — PROGRESS NOTES
"Orthopedic Progress Note      Assessment: 1 Day Post-Op  S/P Procedure(s):  RIGHT TOTAL KNEE ARTHROPLASTY     Plan:   - Continue PT/OT.   - Weightbearing status: WBAT.  - Anticoagulation: resume Xarelto in addition to SCDs, jarrod stockings and early ambulation.  - Discharge planning: pending progression in therapy, pain control, needs to be off O2, and medical clearance    Subjective:  Nausea, Vomiting:  No  Chest pain: No  Lightheadedness, Dizziness:  No  Neuro:  Patient denies new onset numbness or paresthesias in right lower extremity     Patient states that she is \"miserable\". Reports that she did not sleep well last night. Did not receive opioids overnight. Rates pain as 10/10. Endorses pain in right thigh. Denies calf pain. Needing supplemental O2. States she has mild SOB, but has h/o occasional SOB. Tolerating oral intake, voiding & passing gas.  Hgb 10.2.     Objective:  /56 (BP Location: Right arm)   Pulse 78   Temp 97.7  F (36.5  C) (Oral)   Resp 14   Ht 1.575 m (5' 2\")   Wt 96.6 kg (213 lb)   SpO2 92%   BMI 38.96 kg/m    The patient is A&Ox3. Appears comfortable, sitting in recliner.  Sensation is intact to light touch & equal bilaterally in the L2 through S1 dermatomes.  Calves are soft and non-tender.  Dorsiflexion and plantar flexion is intact bilaterally.  Appropriate flexion and extension of the toes bilaterally.   Brisk capillary refill in the toes bilaterally.   Palpable right dorsalis pedis pulse.  right knee dressing C/D/I.     Pertinent Labs   Lab Results: personally reviewed.   No results found for: \"INR\", \"PROTIME\"  Lab Results   Component Value Date    HGB 10.2 (L) 02/26/2025     No results found for: \"NA\", \"CO2\"      Report completed by:  Eulalia Pace PA-C/Dr. [facility]    Date: 2/26/2025  Time: 7:56 AM    "

## 2025-02-27 ENCOUNTER — APPOINTMENT (OUTPATIENT)
Dept: OCCUPATIONAL THERAPY | Facility: CLINIC | Age: 80
DRG: 469 | End: 2025-02-27
Attending: ORTHOPAEDIC SURGERY
Payer: COMMERCIAL

## 2025-02-27 ENCOUNTER — APPOINTMENT (OUTPATIENT)
Dept: ULTRASOUND IMAGING | Facility: CLINIC | Age: 80
DRG: 469 | End: 2025-02-27
Attending: INTERNAL MEDICINE
Payer: COMMERCIAL

## 2025-02-27 ENCOUNTER — APPOINTMENT (OUTPATIENT)
Dept: PHYSICAL THERAPY | Facility: CLINIC | Age: 80
DRG: 469 | End: 2025-02-27
Attending: ORTHOPAEDIC SURGERY
Payer: COMMERCIAL

## 2025-02-27 VITALS
DIASTOLIC BLOOD PRESSURE: 52 MMHG | HEIGHT: 62 IN | RESPIRATION RATE: 16 BRPM | SYSTOLIC BLOOD PRESSURE: 109 MMHG | OXYGEN SATURATION: 92 % | HEART RATE: 81 BPM | TEMPERATURE: 98.4 F | BODY MASS INDEX: 39.2 KG/M2 | WEIGHT: 213 LBS

## 2025-02-27 LAB
FASTING STATUS PATIENT QL REPORTED: YES
GLUCOSE SERPL-MCNC: 112 MG/DL (ref 70–99)
HGB BLD-MCNC: 10.8 G/DL (ref 11.7–15.7)

## 2025-02-27 PROCEDURE — 250N000013 HC RX MED GY IP 250 OP 250 PS 637: Performed by: ORTHOPAEDIC SURGERY

## 2025-02-27 PROCEDURE — 97530 THERAPEUTIC ACTIVITIES: CPT | Mod: GP

## 2025-02-27 PROCEDURE — 82947 ASSAY GLUCOSE BLOOD QUANT: CPT | Performed by: ORTHOPAEDIC SURGERY

## 2025-02-27 PROCEDURE — 97110 THERAPEUTIC EXERCISES: CPT | Mod: GP

## 2025-02-27 PROCEDURE — 36415 COLL VENOUS BLD VENIPUNCTURE: CPT | Performed by: ORTHOPAEDIC SURGERY

## 2025-02-27 PROCEDURE — 97535 SELF CARE MNGMENT TRAINING: CPT | Mod: GO

## 2025-02-27 PROCEDURE — 93971 EXTREMITY STUDY: CPT | Mod: RT

## 2025-02-27 PROCEDURE — 250N000011 HC RX IP 250 OP 636: Performed by: INTERNAL MEDICINE

## 2025-02-27 PROCEDURE — 85018 HEMOGLOBIN: CPT | Performed by: ORTHOPAEDIC SURGERY

## 2025-02-27 PROCEDURE — 250N000013 HC RX MED GY IP 250 OP 250 PS 637: Performed by: INTERNAL MEDICINE

## 2025-02-27 PROCEDURE — 120N000001 HC R&B MED SURG/OB

## 2025-02-27 PROCEDURE — 250N000011 HC RX IP 250 OP 636: Performed by: ORTHOPAEDIC SURGERY

## 2025-02-27 PROCEDURE — 97116 GAIT TRAINING THERAPY: CPT | Mod: GP

## 2025-02-27 PROCEDURE — 250N000012 HC RX MED GY IP 250 OP 636 PS 637: Performed by: INTERNAL MEDICINE

## 2025-02-27 PROCEDURE — 99232 SBSQ HOSP IP/OBS MODERATE 35: CPT | Performed by: INTERNAL MEDICINE

## 2025-02-27 PROCEDURE — 250N000013 HC RX MED GY IP 250 OP 250 PS 637: Performed by: STUDENT IN AN ORGANIZED HEALTH CARE EDUCATION/TRAINING PROGRAM

## 2025-02-27 RX ORDER — HYDROMORPHONE HCL IN WATER/PF 6 MG/30 ML
0.2 PATIENT CONTROLLED ANALGESIA SYRINGE INTRAVENOUS 3 TIMES DAILY PRN
Status: ACTIVE | OUTPATIENT
Start: 2025-02-27

## 2025-02-27 RX ORDER — NALOXONE HYDROCHLORIDE 0.4 MG/ML
0.4 INJECTION, SOLUTION INTRAMUSCULAR; INTRAVENOUS; SUBCUTANEOUS
Status: ACTIVE | OUTPATIENT
Start: 2025-02-27

## 2025-02-27 RX ORDER — HYDROMORPHONE HCL IN WATER/PF 6 MG/30 ML
0.1 PATIENT CONTROLLED ANALGESIA SYRINGE INTRAVENOUS 3 TIMES DAILY PRN
Status: ACTIVE | OUTPATIENT
Start: 2025-02-27

## 2025-02-27 RX ORDER — CALCIUM CARBONATE 500 MG/1
500 TABLET, CHEWABLE ORAL 2 TIMES DAILY PRN
Status: DISPENSED | OUTPATIENT
Start: 2025-02-27

## 2025-02-27 RX ORDER — NALOXONE HYDROCHLORIDE 0.4 MG/ML
0.2 INJECTION, SOLUTION INTRAMUSCULAR; INTRAVENOUS; SUBCUTANEOUS
Status: ACTIVE | OUTPATIENT
Start: 2025-02-27

## 2025-02-27 RX ORDER — FUROSEMIDE 10 MG/ML
40 INJECTION INTRAMUSCULAR; INTRAVENOUS ONCE
Status: COMPLETED | OUTPATIENT
Start: 2025-02-27 | End: 2025-02-27

## 2025-02-27 RX ORDER — METHOCARBAMOL 500 MG/1
500 TABLET, FILM COATED ORAL 3 TIMES DAILY PRN
Status: DISPENSED | OUTPATIENT
Start: 2025-02-27

## 2025-02-27 RX ORDER — ACETAMINOPHEN 325 MG/1
975 TABLET ORAL EVERY 6 HOURS
Status: DISPENSED | OUTPATIENT
Start: 2025-02-27

## 2025-02-27 RX ORDER — GABAPENTIN 100 MG/1
100 CAPSULE ORAL 2 TIMES DAILY
Status: DISPENSED | OUTPATIENT
Start: 2025-02-27

## 2025-02-27 RX ADMIN — ALLOPURINOL 100 MG: 100 TABLET ORAL at 20:14

## 2025-02-27 RX ADMIN — PANTOPRAZOLE SODIUM 40 MG: 40 TABLET, DELAYED RELEASE ORAL at 08:41

## 2025-02-27 RX ADMIN — FUROSEMIDE 40 MG: 10 INJECTION, SOLUTION INTRAVENOUS at 03:26

## 2025-02-27 RX ADMIN — POTASSIUM CHLORIDE 10 MEQ: 750 TABLET, EXTENDED RELEASE ORAL at 08:41

## 2025-02-27 RX ADMIN — LOSARTAN POTASSIUM AND HYDROCHLOROTHIAZIDE 1 TABLET: 50; 12.5 TABLET, FILM COATED ORAL at 08:40

## 2025-02-27 RX ADMIN — POTASSIUM CHLORIDE 10 MEQ: 750 TABLET, EXTENDED RELEASE ORAL at 14:36

## 2025-02-27 RX ADMIN — ACETAMINOPHEN 975 MG: 325 TABLET ORAL at 01:33

## 2025-02-27 RX ADMIN — CALCIUM CARBONATE (ANTACID) CHEW TAB 500 MG 500 MG: 500 CHEW TAB at 20:55

## 2025-02-27 RX ADMIN — CYANOCOBALAMIN TAB 250 MCG 250 MCG: 250 TAB at 08:41

## 2025-02-27 RX ADMIN — POTASSIUM CHLORIDE 10 MEQ: 750 TABLET, EXTENDED RELEASE ORAL at 20:14

## 2025-02-27 RX ADMIN — ACETAMINOPHEN 975 MG: 325 TABLET ORAL at 16:35

## 2025-02-27 RX ADMIN — PREDNISONE 2 MG: 1 TABLET ORAL at 08:40

## 2025-02-27 RX ADMIN — Medication 1 TABLET: at 08:40

## 2025-02-27 RX ADMIN — GABAPENTIN 100 MG: 100 CAPSULE ORAL at 14:36

## 2025-02-27 RX ADMIN — OXYCODONE 5 MG: 5 TABLET ORAL at 07:49

## 2025-02-27 RX ADMIN — METHOCARBAMOL 500 MG: 500 TABLET ORAL at 14:36

## 2025-02-27 RX ADMIN — PANTOPRAZOLE SODIUM 40 MG: 40 TABLET, DELAYED RELEASE ORAL at 20:14

## 2025-02-27 RX ADMIN — ONDANSETRON 4 MG: 4 TABLET, ORALLY DISINTEGRATING ORAL at 01:32

## 2025-02-27 RX ADMIN — METHOCARBAMOL 500 MG: 500 TABLET ORAL at 08:40

## 2025-02-27 RX ADMIN — ACETAMINOPHEN 975 MG: 325 TABLET ORAL at 22:06

## 2025-02-27 RX ADMIN — ONDANSETRON 4 MG: 4 TABLET, ORALLY DISINTEGRATING ORAL at 07:49

## 2025-02-27 RX ADMIN — Medication 1 TABLET: at 20:13

## 2025-02-27 RX ADMIN — Medication 25 MCG: at 08:41

## 2025-02-27 RX ADMIN — ALLOPURINOL 100 MG: 100 TABLET ORAL at 08:41

## 2025-02-27 RX ADMIN — HYDROXYZINE HYDROCHLORIDE 10 MG: 10 TABLET, FILM COATED ORAL at 11:12

## 2025-02-27 RX ADMIN — RIVAROXABAN 10 MG: 10 TABLET, FILM COATED ORAL at 16:35

## 2025-02-27 RX ADMIN — SENNOSIDES AND DOCUSATE SODIUM 1 TABLET: 50; 8.6 TABLET ORAL at 20:13

## 2025-02-27 RX ADMIN — OXYCODONE 5 MG: 5 TABLET ORAL at 01:33

## 2025-02-27 RX ADMIN — ONDANSETRON 4 MG: 4 TABLET, ORALLY DISINTEGRATING ORAL at 14:04

## 2025-02-27 RX ADMIN — ACETAMINOPHEN 975 MG: 325 TABLET ORAL at 11:12

## 2025-02-27 RX ADMIN — SENNOSIDES AND DOCUSATE SODIUM 1 TABLET: 50; 8.6 TABLET ORAL at 07:49

## 2025-02-27 RX ADMIN — POLYETHYLENE GLYCOL 3350 17 G: 17 POWDER, FOR SOLUTION ORAL at 08:40

## 2025-02-27 RX ADMIN — ONDANSETRON 4 MG: 4 TABLET, ORALLY DISINTEGRATING ORAL at 22:06

## 2025-02-27 RX ADMIN — POLYETHYLENE GLYCOL 3350 17 G: 17 POWDER, FOR SOLUTION ORAL at 18:07

## 2025-02-27 RX ADMIN — OXYCODONE 5 MG: 5 TABLET ORAL at 14:04

## 2025-02-27 RX ADMIN — GABAPENTIN 100 MG: 100 CAPSULE ORAL at 20:14

## 2025-02-27 RX ADMIN — OXYCODONE 5 MG: 5 TABLET ORAL at 22:06

## 2025-02-27 ASSESSMENT — ACTIVITIES OF DAILY LIVING (ADL)
ADLS_ACUITY_SCORE: 61
DRESSING/BATHING_DIFFICULTY: NO
ADLS_ACUITY_SCORE: 61
ADLS_ACUITY_SCORE: 49
HEARING_DIFFICULTY_OR_DEAF: NO
CONCENTRATING,_REMEMBERING_OR_MAKING_DECISIONS_DIFFICULTY: NO
ADLS_ACUITY_SCORE: 43
ADLS_ACUITY_SCORE: 62
FALL_HISTORY_WITHIN_LAST_SIX_MONTHS: NO
WEAR_GLASSES_OR_BLIND: NO
ADLS_ACUITY_SCORE: 49
ADLS_ACUITY_SCORE: 43
ADLS_ACUITY_SCORE: 61
ADLS_ACUITY_SCORE: 61
TOILETING_ISSUES: NO
ADLS_ACUITY_SCORE: 61
WALKING_OR_CLIMBING_STAIRS: AMBULATION DIFFICULTY, ASSISTANCE 1 PERSON
ADLS_ACUITY_SCORE: 49
DIFFICULTY_EATING/SWALLOWING: NO
ADLS_ACUITY_SCORE: 62
ADLS_ACUITY_SCORE: 43
ADLS_ACUITY_SCORE: 61
ADLS_ACUITY_SCORE: 43
DIFFICULTY_COMMUNICATING: NO
ADLS_ACUITY_SCORE: 43
ADLS_ACUITY_SCORE: 61
ADLS_ACUITY_SCORE: 61
DOING_ERRANDS_INDEPENDENTLY_DIFFICULTY: NO
WALKING_OR_CLIMBING_STAIRS_DIFFICULTY: YES
ADLS_ACUITY_SCORE: 49
DEPENDENT_IADLS:: INDEPENDENT
EQUIPMENT_CURRENTLY_USED_AT_HOME: GRAB BAR, TOILET
CHANGE_IN_FUNCTIONAL_STATUS_SINCE_ONSET_OF_CURRENT_ILLNESS/INJURY: YES
ADLS_ACUITY_SCORE: 43
ADLS_ACUITY_SCORE: 43

## 2025-02-27 NOTE — PLAN OF CARE
Goal Outcome Evaluation:       Patient vital signs are at baseline: Yes  Patient able to ambulate as they were prior to admission or with assist devices provided by therapies during their stay:  Yes  Patient MUST void prior to discharge:  Yes  Patient able to tolerate oral intake:  Yes  Pain has adequate pain control using Oral analgesics:  No,  Reason: Rating pain 10/10 with activity but 4-5/10 at rest. Pain team consulted.   Does patient have an identified :  Yes  Has goal D/C date and time been discussed with patient:  Yes, accepted to TCU:  per [facility].     Dressing CDI. Ax1 to pivot. Up in chair for meals.

## 2025-02-27 NOTE — PLAN OF CARE
Patient vital signs are at baseline: Yes  Patient able to ambulate as they were prior to admission or with assist devices provided by therapies during their stay:  Yes  Patient MUST void prior to discharge:  Yes  Patient able to tolerate oral intake:  Yes  Pain has adequate pain control using Oral analgesics:  Yes  Does patient have an identified :  Yes  Has goal D/C date and time been discussed with patient:  Yes    Patient alert and oriented. VSS.  2L of o2 via nasal cannula. No IV access. Dressing CDI. Tolerating Regular diet. PRN oxycodone and zofran given for pain and nausea.  Voiding via bedside commode.   Call light within reach. Alarms on.

## 2025-02-27 NOTE — SIGNIFICANT EVENT
Significant Event Note    Time of event: 2:05 AM February 27, 2025    Description of event:  Right leg and knee swelling. Worsening pain of the knee. I went over to see patient. Rt thigh, knee and leg are swollen. No erythema or warmth. DP pulse palpable.     Plan:  Lasix 40 mg IV x1.   Elevate right leg.   US doppler in AM if still swollen with pain.     Discussed with: bedside nurse and patient.     Hi Garcia MD

## 2025-02-27 NOTE — PROGRESS NOTES
"[facility] MEDICINE PROGRESS NOTE      Identification/Summary: Zoey Pereira is a 79 year old female with a past medical history of recurrent dvt, chronic anticoagulation, stable dilatated aortic root hx vasculitis on low dose prednisone who was admitted on 2/25/2025 for right TKR. Hospital course is notable for prolonged incapacitating post op knee pain. Xarelto has been restarted.     Assessment and Plan:    #1 s/p elective total right knee replacement 2/25/2025 (Dr [place])  #2 chronic Xarelto anticoagulation for recurrent DVT (on Lovenox bridge preop)  #3 aortic root dilatation stable  #4 vasculitis by history on low-dose prednisone (2 mg daily)  #5 hypertension continue same medications-med rec completed    Plan: pain consult- discussed with Ortho (Eulalia) and will need TCU. Pain interfering with rehab and post op progress    Clinically Significant Risk Factors Present on Admission                # Drug Induced Coagulation Defect: home medication list includes an anticoagulant medication    # Hypertension: Home medication list includes antihypertensive(s)      # Anemia: based on hgb <11       # Obesity: Estimated body mass index is 38.96 kg/m  as calculated from the following:    Height as of this encounter: 1.575 m (5' 2\").    Weight as of this encounter: 96.6 kg (213 lb).              Diet: Advance Diet as Tolerated: Regular Diet Adult  Discharge Instruction - Regular Diet Adult  [place]os Catheter: Not present  Lines: None       DVT Prophylaxis:  DOAC  Code Status: Full Code    Anticipated possible discharge in 1-2  days to TCU once  milestones are met.  Disposition Plan      Expected Discharge Date: 02/28/2025    Discharge Delays: *Early Discharge Anticipated    Discharge Comments: Pain control and Decreased Mobility        The patient's care was discussed with the Primary team.    Genaro Siddiqui MD  Hospitalist  [facility]  [facility]  Phone: " #[phone]  Securely message with the Vocera Web Console (learn more here)  Text page via [employer] Paging/Directory     Interval History/Subjective:  Rough night with uncontrolled pain. US ordered and pending     Physical Exam/Objective:  Temp:  [97.8  F (36.6  C)-97.9  F (36.6  C)] 97.9  F (36.6  C)  Pulse:  [72-85] 72  Resp:  [16-17] 17  BP: (126-138)/(59-77) 126/60  SpO2:  [92 %-96 %] 92 %  Body mass index is 38.96 kg/m .    GENERAL:  Alert, appears comfortable, in no acute distress, appears stated age   HEAD:  Normocephalic, without obvious abnormality, atraumatic   EYES:  PERRL, conjunctiva/corneas clear, no scleral icterus, EOM's intact   NOSE: Nares normal, septum midline, mucosa normal, no drainage   THROAT: Lips, mucosa, and tongue normal; teeth and gums normal, mouth moist   NECK: Supple, symmetrical, trachea midline   BACK:   Symmetric, no curvature, ROM normal   LUNGS:   Clear to auscultation bilaterally, no rales, rhonchi, or wheezing, symmetric chest rise on inhalation, respirations unlabored   CHEST WALL:  No tenderness or deformity   HEART:  Regular rate and rhythm, S1 and S2 normal, no murmur, rub, or gallop    ABDOMEN:   Soft, non-tender, bowel sounds active all four quadrants, no masses, no organomegaly, no rebound or guarding   EXTREMITIES: Extremities normal, atraumatic, no cyanosis or edema    SKIN: Dry to touch, no exanthems in the visualized areas   NEURO: Alert, oriented x3, moves all four extremities freely   PSYCH: Cooperative, behavior is appropriate      Data reviewed today: I personally reviewed all new medications, labs, imaging/diagnostics reports over the past 24 hours. Pertinent findings include:    Imaging:   No results found for this or any previous visit (from the past 24 hours).    Labs:  Most Recent 3 CBC's:  Recent Labs   Lab Test 02/27/25  0642 02/26/25  0617   HGB 10.8* 10.2*     Most Recent 3 BMP's:  Recent Labs   Lab Test 02/27/25  0642 02/26/25  0617 02/25/25  0629  04/08/21  1452   POTASSIUM  --   --  3.5  --    CR  --   --  0.96* 1.2*   * 106*  --   --        Medications:   Personally Reviewed.  Medications   Current Facility-Administered Medications   Medication Dose Route Frequency Provider Last Rate Last Admin     Current Facility-Administered Medications   Medication Dose Route Frequency Provider Last Rate Last Admin    acetaminophen (TYLENOL) tablet 975 mg  975 mg Oral Q8H James Paris MD   975 mg at 02/27/25 1112    allopurinol (ZYLOPRIM) tablet 100 mg  100 mg Oral BID Genaro Siddiqui MD   100 mg at 02/27/25 0841    calcium carbonate-vitamin D (OSCAL) 500-5 MG-MCG per tablet 1 tablet  1 tablet Oral BID Genaro Siddiqui MD   1 tablet at 02/27/25 0840    hydrOXYzine HCl (ATARAX) tablet 10 mg  10 mg Oral TID Myrtle Gómez MD   10 mg at 02/27/25 1112    losartan-hydrochlorothiazide (HYZAAR) 50-12.5 MG per tablet 1 tablet  1 tablet Oral Daily Genaro Siddiqui MD   1 tablet at 02/27/25 0840    methocarbamol (ROBAXIN) tablet 500 mg  500 mg Oral TID Myrtle Gómez MD   500 mg at 02/27/25 0840    pantoprazole (PROTONIX) EC tablet 40 mg  40 mg Oral BID Genaro Siddiqui MD   40 mg at 02/27/25 0841    polyethylene glycol (MIRALAX) Packet 17 g  17 g Oral BID Myrtle Gómez MD   17 g at 02/27/25 0840    potassium chloride kirsten ER (KLOR-CON M10) CR tablet 10 mEq  10 mEq Oral TID Genaro Siddiqui MD   10 mEq at 02/27/25 0841    predniSONE (DELTASONE) tablet 2 mg  2 mg Oral Daily Genaro Siddiqui MD   2 mg at 02/27/25 0840    rivaroxaban ANTICOAGULANT (XARELTO) tablet 10 mg  10 mg Oral Daily with supper James Paris MD   10 mg at 02/26/25 1721    ROPivacaine (NAROPIN) 5 MG/ mg, EPINEPHrine (ADRENALIN) 0.6 mg in sodium chloride 0.9 % 100 mL (ORTHO CRISTELA CUSTOM DOSE)   INTRA-ARTICULAR On Call to OR Akilah Feng PA-C        scopolamine (TRANSDERM) 72 hr patch 1 patch  1 patch Transdermal Once Mitchell Desir MD   1 patch at 02/25/25 0685    senna-docusate  (SENOKOT-S/PERICOLACE) 8.6-50 MG per tablet 1 tablet  1 tablet Oral BID James Paris MD   1 tablet at 02/27/25 0749    sodium chloride (PF) 0.9% PF flush 3 mL  3 mL Intracatheter Q8H James Paris MD   3 mL at 02/27/25 1114    vitamin B-12 (CYANOCOBALAMIN) tablet 250 mcg  250 mcg Oral Daily Genaro Siddiqui MD   250 mcg at 02/27/25 0841    Vitamin D3 (CHOLECALCIFEROL) tablet 25 mcg  25 mcg Oral Daily Genaro Siddiqui MD   25 mcg at 02/27/25 0841

## 2025-02-27 NOTE — PROGRESS NOTES
"Orthopedic Progress Note      Assessment: 2 Days Post-Op  S/P Procedure(s):  RIGHT TOTAL KNEE ARTHROPLASTY     Plan:   - Continue PT/OT.   - Weightbearing status: WBAT.  - Anticoagulation: resume Xarelto in addition to SCDs, jarrod stockings and early ambulation.  - Discharge planning: pending progression in therapy, pain control, needs to be off O2, and medical clearance   - Doppler US ordered by hospitalist to eval for DVT: negative for DVT    Subjective:  Nausea, Vomiting:  No  Lightheadedness, Dizziness:  No  Neuro:  Patient denies new onset numbness or paresthesias in right lower extremity     Patient reports that she is not doing well. She endorses severe pain in right thigh. States she is \"unsure if this is how a knee replacement is supposed to feel\". Denies calf pain. Reports that her chest feels tight. Feels that the pain is related \"taking all of her medicine at the same time and they are stuck\". h/o GERD. Feels like she is \"burping up acid\". Had similar sensation yesterday and resolved. Continues to use supplemental O2, 1 LPM vs 2LPM yesterday. Working with therapy, but states that it has been very difficult to get OOB, painful to stand. Patient using PureWick.      Objective:  /60 (BP Location: Left arm)   Pulse 72   Temp 97.9  F (36.6  C) (Axillary)   Resp 17   Ht 1.575 m (5' 2\")   Wt 96.6 kg (213 lb)   SpO2 92%   BMI 38.96 kg/m    The patient is A&Ox3. Appears comfortable, sitting up at bedside.  Sensation is intact to light touch & equal bilaterally in the L2 through S1 dermatomes.  Calves are soft and non-tender.  Dorsiflexion and plantar flexion is intact bilaterally.  Appropriate flexion and extension of the toes bilaterally.   Brisk capillary refill in the toes bilaterally.   Palpable right dorsalis pedis pulse.  right knee dressing C/D/I.   Right thigh TTP, compartments soft, mild ecchymosis      Pertinent Labs   Lab Results: personally reviewed.   No results found for: \"INR\", " "\"PROTIME\"  Lab Results   Component Value Date    HGB 10.8 (L) 02/27/2025     No results found for: \"NA\", \"CO2\"      Report completed by:  Eulalia Pace PA-C/Dr. [facility]    Date: 2/27/2025  Time: 9:14 AM    "

## 2025-02-27 NOTE — PLAN OF CARE
Goal Outcome Evaluation:       Patient vital signs are at baseline: No,  Reason:  On 2L O2  Patient able to ambulate as they were prior to admission or with assist devices provided by therapies during their stay:  No,  Reason:  Pt very painful, increases with movement. Pivot transfers  Patient MUST void prior to discharge:  Yes, pt utilizing purewick overnight  Patient able to tolerate oral intake:  Yes  Pain has adequate pain control using Oral analgesics:  No,  Reason:  Pt utilizing scheduled pain meds and PRN oxycodone, ice packs. Pt having severe pain with movement and pt concerned if having DVT d/t hx. Notified MD. Ordered IV lasix once, leg elevation, and ultrasound in AM.   Does patient have an identified :  Yes  Has goal D/C date and time been discussed with patient:  Yes

## 2025-02-27 NOTE — PROGRESS NOTES
[facility] ACUTE PAIN SERVICE CONSULTATION   [facility], [facility], [facility], [facility], [place]     Date of Admission:  2/25/2025  Date of Consult (When I saw the patient): 02/27/25  Physician requesting consult: Genaro Siddiqui MD       Assessment/Plan:     Zoey Pereira is a 79 year old female who was admitted on 2/25/2025.  Pain team was asked to see the patient for prolonged post op (TKR) pain. help appreciated . Admitted for planned procedure. History of recurrent DVT on anticogulation, vasculitis on prednisone, HTN, resp failure - per notes likely d/t atelectasis - stopped IVF on lasix, watch renal function, and constipation - last BM Monday evening on bowel meds - monitor.      Post op day: 2 Days Post-Op. RIGHT TOTAL KNEE ARTHROPLASTY     Opioid Induced Respiratory Depression Risk Assessment: HIGH - on supp O2, opioid naive, age >60, on cns depressants, obesity, post op    Pt requiring 1 L of O2 via NC - to maintain O2 sats. Pt has used x 4 doses of oxycodone 5 mg in 24 hours.     Patient reporting throbbing pain in her right knee which radiates up thigh, denies muscle spasms. Stated that her pain was controlled evening of procedure - but worsened following AM - block likely wore off. Having trouble with PT/OT and moving around. Reports the hydroxyzine is making her sleepy and per family and patient hard to know what is working - recommend cleaning up meds, since denies muscle spasms will change robaxin to prn - stop this if continue to not use, stop scheduled hydroxyzine, continue oxycodone - using this with zofran d/t nausea with all opioids, trial gabapentin 100 mg po BID for perioperative pain.       PLAN:   1) Pain is consistent with post op pain in the setting of high pain and O2 utilization.   Multimodal Medication Therapy  Topical: none  NSAID'S: none d/t CrCl 50.7 ml/min and on xarelto  Steroids: prednisone 2 mg po daily  Muscle Relaxants: robaxin 500 mg po TID - change to tid prn and stop  if not needed   Adjuvants: APAP 975 mg po q8h - increase to q6h, hydroxyzine 10 mg po TID - discontinue, had a intra-articular block on 2/25 - likely worn off  Antidepressants/anxiolytics: none  Opioids: oxycodone 2.5-5 mg po q4h prn   IV Pain medication: dilaudid 0.1- 0.2 mg IV q4h prn - not using - decrease to tid prn  Non-medication interventions: per nursing  Constipation Prophylaxis: scheduled and prn     -MN  pulled from system on 2/27/2025. See fill history below.   Discharge Recommendations - We recommend prescribing the following at the time of discharge: TBD     History of Present Illness (HPI):       Zoey Pereira is a 79 year old female who presented with past medical history as above.     Per MN  review, the patient does not have an opioid tolerance - no recent fills for opioids.     Reviewed medical record, labs, imaging, ED note, and care everywhere.     Past pain treatments have included: tramadol 50 - last filled #20 (5 day supply) on 3/20/24 from Anshul Lam DO, dilaudid 2 mg - last filled 9/25/24 #5 (1 day supply) from Amanda Alves, robaxin 750 mg #24 (6 day supply) last filled 5/25/24    Home pain medications/psych medications/anticoagulation medications include: APAP 500-1000 mg po q6h prn, prednisone 2 mg po daily - fills regularly from pavithra martin (90 day supply last filled 12/16/24), was on xarelto 10 mg po daily however instructed to take last dose 2/21/25 before knee surgery      Lucy Nazario, PharmD, BCPS  Acute Care Inpatient Pain Management Program  [facility] ([facility])  Hours of coverage Monday-Friday [phone]. After hours please contact Primary team   Page via Epic chat or [employer]

## 2025-02-27 NOTE — CONSULTS
Care Management Initial Consult    General Information  Assessment completed with: Patient,    Type of CM/SW Visit: Initial Assessment    Primary Care Provider verified and updated as needed: Yes   Readmission within the last 30 days: no previous admission in last 30 days      Reason for Consult: discharge planning  Advance Care Planning:            Communication Assessment  Patient's communication style: spoken language (English or Bilingual)    Hearing Difficulty or Deaf: no   Wear Glasses or Blind: no    Cognitive  Cognitive/Neuro/Behavioral: WDL  Level of Consciousness: alert  Arousal Level: opens eyes spontaneously  Orientation: oriented x 4  Mood/Behavior: behavior appropriate to situation, cooperative, calm     Speech: logical, spontaneous, clear    Living Environment:   People in home: spouse  Reba   Current living Arrangements: town home      Able to return to prior arrangements: yes       Family/Social Support:  Care provided by: self, spouse/significant other  Provides care for:    Marital Status:   Support system: , Children          Description of Support System: Supportive, Involved         Current Resources:   Patient receiving home care services: No        Community Resources: None  Equipment currently used at home: grab bar, toilet  Supplies currently used at home: None    Employment/Financial:  Employment Status: retired        Financial Concerns: none   Referral to Financial Worker: No       Does the patient's insurance plan have a 3 day qualifying hospital stay waiver?  Yes     Which insurance plan 3 day waiver is available? Alternative insurance waiver    Will the waiver be used for post-acute placement? Undetermined at this time    Lifestyle & Psychosocial Needs:  Social Drivers of Health     Food Insecurity: Low Risk  (2/27/2025)    Food Insecurity     Within the past 12 months, did you worry that your food would run out before you got money to buy more?: No     Within the  past 12 months, did the food you bought just not last and you didn t have money to get more?: No   Depression: Not at risk (2/11/2025)    Received from [facility]    PHQ-2     PHQ-2 TOTAL SCORE: 0   Housing Stability: Low Risk  (2/27/2025)    Housing Stability     Do you have housing? : Yes     Are you worried about losing your housing?: No   Tobacco Use: Low Risk  (2/25/2025)    Patient History     Smoking Tobacco Use: Never     Smokeless Tobacco Use: Never     Passive Exposure: Not on file   Financial Resource Strain: Low Risk  (2/27/2025)    Financial Resource Strain     Within the past 12 months, have you or your family members you live with been unable to get utilities (heat, electricity) when it was really needed?: No   Alcohol Use: Not At Risk (5/23/2023)    Received from [facility]    AUDIT-C     Frequency of Alcohol Consumption: Monthly or less     Average Number of Drinks: 1 or 2     Frequency of Binge Drinking: Never   Transportation Needs: Low Risk  (2/27/2025)    Transportation Needs     Within the past 12 months, has lack of transportation kept you from medical appointments, getting your medicines, non-medical meetings or appointments, work, or from getting things that you need?: No   Physical Activity: Unknown (12/13/2021)    Received from [facility]    Exercise Vital Sign     Days of Exercise per Week: 0 days     Minutes of Exercise per Session: Not on file   Interpersonal Safety: Low Risk  (2/25/2025)    Interpersonal Safety     Do you feel physically and emotionally safe where you currently live?: Yes     Within the past 12 months, have you been hit, slapped, kicked or otherwise physically hurt by someone?: No     Within the past 12 months, have you been humiliated or emotionally abused in other ways by your partner or ex-partner?: No   Stress: Stress Concern Present (5/23/2023)    Received from [facility]    [employer] -  Occupational Stress Questionnaire     Feeling of Stress : To some extent   Social Connections: Socially Integrated (9/25/2024)    Received from [facility]    Social Connections     Do you often feel lonely or isolated from those around you?: 0   Health Literacy: Not on file       Functional Status:  Prior to admission patient needed assistance:   Dependent ADLs:: Independent  Dependent IADLs:: Independent       Mental Health Status:  Mental Health Status: No Current Concerns       Chemical Dependency Status:  Chemical Dependency Status: No Current Concerns             Values/Beliefs:  Spiritual, Cultural Beliefs, Quaker Practices, Values that affect care: no               Discussed  Partnership in Safe Discharge Planning  document with patient/family: Yes:     Additional Information:  FABIANA met and introduced self and CM services to Pt, her  and daughter who was visiting.  Pt lives with  in a town home.  Pt independent at baseline. Pt is currently on 02 and was not on 02 at home.  Ortho PA is thinking Pt will need TCU.  FABIANA gave Pt list and chose [facility]. Referral sent.  Katherine at [facility] accepted and will put in for [employer]. Transportation TBD.  Family if able.     3:59 PM  Katherine called and [employer] received.  AISHA told Pt and wanted [facility] w/c ride with 02 and set up for 11:30 AM tomorrow.  FABIANA left VM for Pt's , Reba.       Next Steps: ARAM Fang

## 2025-02-28 ENCOUNTER — APPOINTMENT (OUTPATIENT)
Dept: OCCUPATIONAL THERAPY | Facility: CLINIC | Age: 80
DRG: 469 | End: 2025-02-28
Attending: ORTHOPAEDIC SURGERY
Payer: COMMERCIAL

## 2025-02-28 ENCOUNTER — APPOINTMENT (OUTPATIENT)
Dept: RADIOLOGY | Facility: CLINIC | Age: 80
DRG: 469 | End: 2025-02-28
Attending: HOSPITALIST
Payer: COMMERCIAL

## 2025-02-28 ENCOUNTER — APPOINTMENT (OUTPATIENT)
Dept: PHYSICAL THERAPY | Facility: CLINIC | Age: 80
DRG: 469 | End: 2025-02-28
Attending: ORTHOPAEDIC SURGERY
Payer: COMMERCIAL

## 2025-02-28 VITALS
BODY MASS INDEX: 39.2 KG/M2 | RESPIRATION RATE: 16 BRPM | HEIGHT: 62 IN | DIASTOLIC BLOOD PRESSURE: 60 MMHG | WEIGHT: 213 LBS | TEMPERATURE: 97.5 F | OXYGEN SATURATION: 92 % | HEART RATE: 83 BPM | SYSTOLIC BLOOD PRESSURE: 113 MMHG

## 2025-02-28 PROCEDURE — 97116 GAIT TRAINING THERAPY: CPT | Mod: GP

## 2025-02-28 PROCEDURE — 99232 SBSQ HOSP IP/OBS MODERATE 35: CPT | Performed by: HOSPITALIST

## 2025-02-28 PROCEDURE — 250N000013 HC RX MED GY IP 250 OP 250 PS 637: Performed by: ORTHOPAEDIC SURGERY

## 2025-02-28 PROCEDURE — 250N000013 HC RX MED GY IP 250 OP 250 PS 637: Performed by: INTERNAL MEDICINE

## 2025-02-28 PROCEDURE — 250N000013 HC RX MED GY IP 250 OP 250 PS 637: Performed by: STUDENT IN AN ORGANIZED HEALTH CARE EDUCATION/TRAINING PROGRAM

## 2025-02-28 PROCEDURE — 250N000011 HC RX IP 250 OP 636: Performed by: ORTHOPAEDIC SURGERY

## 2025-02-28 PROCEDURE — 71046 X-RAY EXAM CHEST 2 VIEWS: CPT

## 2025-02-28 PROCEDURE — 97535 SELF CARE MNGMENT TRAINING: CPT | Mod: GO

## 2025-02-28 PROCEDURE — 97110 THERAPEUTIC EXERCISES: CPT | Mod: GP

## 2025-02-28 PROCEDURE — 250N000012 HC RX MED GY IP 250 OP 636 PS 637: Performed by: INTERNAL MEDICINE

## 2025-02-28 PROCEDURE — 99222 1ST HOSP IP/OBS MODERATE 55: CPT | Mod: 95

## 2025-02-28 RX ORDER — CEFADROXIL 500 MG/1
500 CAPSULE ORAL 2 TIMES DAILY
Qty: 28 CAPSULE | Refills: 0 | Status: SHIPPED | OUTPATIENT
Start: 2025-02-28

## 2025-02-28 RX ORDER — ACETAMINOPHEN 325 MG/1
975 TABLET ORAL EVERY 6 HOURS
Qty: 100 TABLET | Refills: 0 | Status: SHIPPED | OUTPATIENT
Start: 2025-02-28

## 2025-02-28 RX ORDER — AMOXICILLIN 250 MG
1 CAPSULE ORAL 2 TIMES DAILY
Qty: 60 TABLET | Refills: 0 | Status: SHIPPED | OUTPATIENT
Start: 2025-02-28

## 2025-02-28 RX ORDER — GABAPENTIN 100 MG/1
100 CAPSULE ORAL 2 TIMES DAILY
Qty: 30 CAPSULE | Refills: 0 | Status: SHIPPED | OUTPATIENT
Start: 2025-02-28

## 2025-02-28 RX ORDER — POLYETHYLENE GLYCOL 3350 17 G/17G
17 POWDER, FOR SOLUTION ORAL DAILY
Qty: 510 G | Refills: 0 | Status: SHIPPED | OUTPATIENT
Start: 2025-02-28

## 2025-02-28 RX ORDER — OXYCODONE HYDROCHLORIDE 5 MG/1
5 TABLET ORAL EVERY 4 HOURS PRN
Qty: 26 TABLET | Refills: 0 | Status: SHIPPED | OUTPATIENT
Start: 2025-02-28

## 2025-02-28 RX ADMIN — RIVAROXABAN 10 MG: 10 TABLET, FILM COATED ORAL at 16:06

## 2025-02-28 RX ADMIN — OXYCODONE 5 MG: 5 TABLET ORAL at 14:40

## 2025-02-28 RX ADMIN — ACETAMINOPHEN 975 MG: 325 TABLET ORAL at 16:06

## 2025-02-28 RX ADMIN — OXYCODONE 5 MG: 5 TABLET ORAL at 09:24

## 2025-02-28 RX ADMIN — POLYETHYLENE GLYCOL 3350 17 G: 17 POWDER, FOR SOLUTION ORAL at 08:43

## 2025-02-28 RX ADMIN — POTASSIUM CHLORIDE 10 MEQ: 750 TABLET, EXTENDED RELEASE ORAL at 09:25

## 2025-02-28 RX ADMIN — ONDANSETRON 4 MG: 4 TABLET, ORALLY DISINTEGRATING ORAL at 09:25

## 2025-02-28 RX ADMIN — SENNOSIDES AND DOCUSATE SODIUM 1 TABLET: 50; 8.6 TABLET ORAL at 08:43

## 2025-02-28 RX ADMIN — Medication 25 MCG: at 09:24

## 2025-02-28 RX ADMIN — CYANOCOBALAMIN TAB 250 MCG 250 MCG: 250 TAB at 09:24

## 2025-02-28 RX ADMIN — PREDNISONE 2 MG: 1 TABLET ORAL at 08:45

## 2025-02-28 RX ADMIN — ACETAMINOPHEN 975 MG: 325 TABLET ORAL at 05:07

## 2025-02-28 RX ADMIN — POTASSIUM CHLORIDE 10 MEQ: 750 TABLET, EXTENDED RELEASE ORAL at 13:47

## 2025-02-28 RX ADMIN — GABAPENTIN 100 MG: 100 CAPSULE ORAL at 08:45

## 2025-02-28 RX ADMIN — PANTOPRAZOLE SODIUM 40 MG: 40 TABLET, DELAYED RELEASE ORAL at 08:44

## 2025-02-28 RX ADMIN — OXYCODONE 5 MG: 5 TABLET ORAL at 05:20

## 2025-02-28 RX ADMIN — ALLOPURINOL 100 MG: 100 TABLET ORAL at 08:45

## 2025-02-28 RX ADMIN — LOSARTAN POTASSIUM AND HYDROCHLOROTHIAZIDE 1 TABLET: 50; 12.5 TABLET, FILM COATED ORAL at 08:44

## 2025-02-28 RX ADMIN — ACETAMINOPHEN 975 MG: 325 TABLET ORAL at 11:59

## 2025-02-28 RX ADMIN — Medication 1 TABLET: at 09:24

## 2025-02-28 ASSESSMENT — ACTIVITIES OF DAILY LIVING (ADL)
ADLS_ACUITY_SCORE: 43

## 2025-02-28 NOTE — PLAN OF CARE
Problem: Knee Arthroplasty  Goal: Optimal Pain Control and Function  Outcome: Progressing      Patient alert and oriented. VSS. On 1L of oxygen via nasal cannula. Saline locked. Tolerating Regular diet. A1 with walker and gait belt. PRN oxycodone and zofran given. Call light within reach. Alarms on.

## 2025-02-28 NOTE — PLAN OF CARE
Physical Therapy Discharge Summary    Reason for therapy discharge:    Discharged to transitional care facility.    Progress towards therapy goal(s). See goals on Care Plan in [facility] electronic health record for goal details.  Goals not met.  Barriers to achieving goals:   discharge from facility.    Therapy recommendation(s):    Continued therapy is recommended.  Rationale/Recommendations:  continued PT at TCU to improve functional mobility.  Continue home exercise program.

## 2025-02-28 NOTE — CONSULTS
[facility] ACUTE INPATIENT PAIN SERVICE    [facility], [facility], [facility], [facility], [place]   PAIN CONSULT     Assessment/Plan:  Zoey Pereira is a 79 year old female who was admitted on 2/25/2025.  Pain team was asked by Dr. Genaro Siddiqui to see the patient for prolonged post op (TKR) pain. Admitted for planned procedure. 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    shows two previous opioid prescriptions, most recently 9/25/24. Describes pain as 5/10 to the right knee, worst when going from sitting to standing.     Post op day: 3 Days Post-Op. RIGHT TOTAL KNEE ARTHROPLASTY      Opioid Induced Respiratory Depression Risk Assessment: HIGH - on supp O2, opioid naive, age >60, on cns depressants, obesity, post op    In the last 24 hours, Zoey has received: 4 (5mg) oxycodone     PLAN:   Pain is consistent with post op pain in the setting of high pain and O2 utilization.Pain improving since surgery.  Multimodal Medication Therapy:   Adjuvants:   Acetaminophen 975mg q6h   Gabapentin 100mg bid  Robaxin discontinued  Opioids: Oxycodone 2.5-5mg q4h prn   IV dilaudid discontinued   Non-medication interventions- Ice, heat prn   Constipation Prophylaxis- Scheduled miralax and senna   Follow up /Discharge Recommendations - We recommend prescribing the following at the time of discharge:    Gabapentin 100mg bid   Acetaminophen 975mg q6h   Oxycodone 2.5-5mg qid prn       Subjective:  Patient is seen resting in bed and appears comfortable. Currently endorsing 5/10 pain to the right knee, improving since surgery. She was able to walk with PT today. Pain worse when going from sitting to standing, then walking went well until knee began to ache. Pain went up to 7/10 when getting up. She is unsure which medications have been most helpful. Denies nausea,  "vomiting, diarrhea, constipation, chest pain, shortness of breath. Oxycodone does cause some nausea, takes zofran with it.     Medication regimen and adjustments made reviewed with daughter and patient. Patient verbalized understanding and is in agreement with the plan. All questions answered.        Hospitalist updated on discharge recs.     Visit/Communication Style   Virtual (Video) communication was used to evaluate Zoey.  Zoey consented to the use of video communication.  Video START time: 1310, 2/28/2025  Video STOP time: 1330, 2/28/2025   Patient's location: [address]  Provider's location during the visit: [facility]       History   Drug Use Unknown         Tobacco Use      Smoking status: Never      Smokeless tobacco: Never        Objective:  Vital signs in last 24 hours:  B/P: 125/58, T: 97.3, P: 74, R: 16   Blood pressure 125/58, pulse 74, temperature 97.3  F (36.3  C), temperature source Oral, resp. rate 16, height 1.575 m (5' 2\"), weight 96.6 kg (213 lb), SpO2 94%.        Review of Systems:   As per subjective, all others negative.    Physical Exam  General: in no apparent distress and non-toxic, resting in chair and appears comfortable  HEENT: Head normocephalic atraumatic  Resp: Respirations unlabored, on oxygen via nasal cannula   Psych: Normal affect  Neuro: Alert and oriented x3          Imaging:  Personally Reviewed.    Results for orders placed or performed during the hospital encounter of 02/25/25   XR Knee Port Right 1/2 Views    Impression    IMPRESSION:   Postop changes related to interval placement of a right total knee arthroplasty with patellar resurfacing. The components are in the expected position without acute displaced periprosthetic fracture. Expected recent postop soft tissue and intra-articular   gas with knee soft tissue swelling.   US Lower Extremity Venous Duplex Right    Impression    IMPRESSION:  1.  No deep venous thrombosis " "in the right lower extremity.        Lab Results:  Personally Reviewed.   Last Comprehensive Metabolic Panel:  Potassium   Date Value Ref Range Status   02/25/2025 3.5 3.4 - 5.3 mmol/L Final     Glucose   Date Value Ref Range Status   02/27/2025 112 (H) 70 - 99 mg/dL Final     Creatinine   Date Value Ref Range Status   02/25/2025 0.96 (H) 0.51 - 0.95 mg/dL Final     GFR Estimate   Date Value Ref Range Status   02/25/2025 60 (L) >60 mL/min/1.73m2 Final     Comment:     eGFR calculated using 2021 CKD-EPI equation.   04/08/2021 44 (L) >60 mL/min/1.73m2 Final        UA: No results found for: \"UAMP\", \"UBARB\", \"BENZODIAZEUR\", \"UCANN\", \"UCOC\", \"OPIT\", \"UPCP\"           Please see A&P for additional details of medical decision making.  MANAGEMENT DISCUSSED with the following over the past 24 hours:   -Hospitalist   NOTE(S)/MEDICAL RECORDS REVIEWED over the past 24 hours:   -Pain: reviewed adjustments made to plan   -ORTHO reviewed interval history   Hospitalist reviewed PMH, HPI  Tests personally interpreted in the past 24 hours:  - ULTRASOUND showing no DVT  Tests REVIEWED in the past 24 hours:  - Creatinine, GFR, Hemoglobin  SUPPLEMENTAL HISTORY, in addition to the patient's history, over the past 24 hours obtained from:   - Daughter  Medical complexity over the past 24 hours:  - Parenteral (IV) CONTROLLED SUBSTANCES ordered  - Prescription DRUG MANAGEMENT performed          JASON Bowers-BC  Acute Care Pain Management Program   Hours of pain coverage Mon-Fri [id number], afterhours please call the primary team   [email or web address] ([email or web address], Loni, SD, RH)   Page via [employer] Messaging or [employer] web console -Click for [employer]            "

## 2025-02-28 NOTE — PROGRESS NOTES
Care Management Discharge Note    Discharge Date: 02/28/2025       Discharge Disposition: Transitional Care    Discharge Services: None    Discharge DME: None    Discharge Transportation: family or friend will provide, agency    Private pay costs discussed: transportation costs    Does the patient's insurance plan have a 3 day qualifying hospital stay waiver?  Yes     Which insurance plan 3 day waiver is available? Alternative insurance waiver    Will the waiver be used for post-acute placement? Yes    PAS Confirmation Code: [id number]  Patient/family educated on Medicare website which has current facility and service quality ratings: yes    Education Provided on the Discharge Plan: Yes  Persons Notified of Discharge Plans: Pt, daughter and TCU   Patient/Family in Agreement with the Plan: yes    Handoff Referral Completed: No, handoff not indicated or clinically appropriate    Additional Information:    Hospitalist is ordering chest Xray and needed to push out transport and next available is 5:30 PM.  [facility] is aware.    4:04 PM  Pt discharging to [facility] today. [facility] transport called and can  Pt between 4:30 PM and 5:15.  [facility] called and notified nurse at [facility].  [facility] let Pt know. PAS completed.     ARAM Villanueva

## 2025-02-28 NOTE — PLAN OF CARE
Problem: Knee Arthroplasty  Goal: Optimal Coping  Outcome: Progressing   Goal Outcome Evaluation:      Patient vital signs are at baseline: Yes  Patient able to ambulate as they were prior to admission or with assist devices provided by therapies during their stay:  Yes  Patient MUST void prior to discharge:  Yes  Patient able to tolerate oral intake:  Yes  Pain has adequate pain control using Oral analgesics:  Yes  Does patient have an identified :  Yes  Has goal D/C date and time been discussed with patient:  Yes

## 2025-02-28 NOTE — DISCHARGE SUMMARY
"ORTHOPEDIC DISCHARGE SUMMARY       Zoey Pereira,  1945, MRN [id number]    Admission Date: 2025      Admission Diagnoses: Primary osteoarthritis of right knee [M17.11]  Status post joint replacement [Z96.60]     Discharge Date:  2025      Post-operative Day:  3 Days Post-Op    Reason for Admission: The patient was admitted for the following: Procedure(s):  RIGHT TOTAL KNEE ARTHROPLASTY    BRIEF HOSPITAL COURSE   Zoey Pereira is a pleasant 79 year old female who underwent the aforementioned procedure with Dr. Paris on 25. There were no intraoperative complications and the patient was transferred to the recovery room and later the orthopedic unit in stable condition. Once the patient reached the orthopedic floor our orthopedic pain protocol was implemented along with the following:    Anticoagulation Medications: Xarelto  Therapy: PT and OT  Activity: WBAT  Bracing: None    Consultations during Admission: Hospitalist service for medical management     COMPLICATIONS/SIGNIFICANT FINDINGS        DISCHARGE INFORMATION   Condition at discharge: Good  Discharge destination: Skilled nursing facility  Patient was seen by myself on the date of discharge.    FOLLOW UP CARE   Follow up with orthopedics in 2 weeks or sooner should the need arise. Ortho will continue to manage pain control, post op anticoagulation and incision care.     Follow up with your PCP for management of chronic medical problems and to evaluate for post op medical complications including constipation, nausea/vomiting, DVT/PE, anemia, changes in blood pressure, fevers/chills, urinary retention and atelectasis/pneumonia.     Subjective   Patient is doing well on POD #1. Pain is well controlled with oral medications. Ambulating. Tolerating oral intake.     Physical Exam   /55 (BP Location: Left arm, Patient Position: Supine, Cuff Size: Adult Large)   Pulse 74   Temp 97.6  F (36.4  C) (Oral)   Resp 16   Ht 1.575 m (5' 2\")   " Wt 96.6 kg (213 lb)   SpO2 (!) 90%   BMI 38.96 kg/m    The patient is A&Ox3. Appears comfortable, sitting in recliner.  Sensation is intact to light touch & equal bilaterally in the L2 through S1 dermatomes.  Calves are soft and non-tender.  Dorsiflexion and plantar flexion is intact bilaterally.  Appropriate flexion and extension of the toes bilaterally.   Brisk capillary refill in the toes bilaterally.   Palpable right dorsalis pedis pulse.  right knee dressing C/D/I.     Pertinent Results at Discharge     Hemoglobin   Date/Time Value Ref Range Status   02/27/2025 06:42 AM 10.8 (L) 11.7 - 15.7 g/dL Final   02/26/2025 06:17 AM 10.2 (L) 11.7 - 15.7 g/dL Final       Problem List   Active Problems:    Status post joint replacement      Eulalia Pace PA-C/Dr. [facility]  [phone]  Date: 2/28/2025  Time: 2:21 PM

## 2025-02-28 NOTE — PLAN OF CARE
Goal Outcome Evaluation:       Patient vital signs are at baseline: No, patient requiring 1L O2 NC this morning, attempting to wean pt off O2, sating 92 on RA when awake.   Patient able to ambulate as they were prior to admission or with assist devices provided by therapies during their stay:  Yes  Patient MUST void prior to discharge:  Yes  Patient able to tolerate oral intake:  Yes  Pain has adequate pain control using Oral analgesics:  Yes  Does patient have an identified :  Yes  Has goal D/C date and time been discussed with patient:  Yes, [facility] TCU today @ 1700 per CM.

## 2025-02-28 NOTE — PROGRESS NOTES
[facility]    Medicine Progress Note - Hospitalist Service    Date of Admission:  2/25/2025    Assessment & Plan   Identification: Zoey Pereira is a 79 year old female   PMHx: Vasculitis on low-dose prednisone (2 mg), aortic root dilation, recurrent DVT (on Xarelto), HTN    February 25: Underwent right total knee arthroplasty with Dr. Paris, 50 cc EBL, full-thickness tricompartmental cartilage loss noted, no complications.  [facility] [place] [facility] for medical comanagement.  Struggled with pain control postoperatively receiving hydroxyzine, acetaminophen, ketorolac, gabapentin, oxycodone, and Robaxin.  Also struggled with nausea requiring numerous doses of ondansetron.  Postoperatively she was diagnosed with acute hypoxic respiratory failure due to atelectasis.  She received a dose of IV diuretic.  She also experienced postoperative constipation.  Pain service was consulted.  Right knee developed swelling and Doppler performed which was negative for recurrent DVT.    Today:  A little bit painful, not sure why she is hypoxic  -Try to wean O2  -Check chest x-ray  -Push incentive spirometry  *Discussed with bedside nurse x2  *Discussed with nurse manager  *Discussed with multiple family members  *Discussed with pain team  If she gets off O2, probably okay for discharge from my standpoint    Hypoxia  Suspect atelectasis.  Lungs fairly clear otherwise.  Could be compounded by anemia, obesity, pickwickian, medication  Inevitably hemoglobin down a bit.  Story does not sound like PE, but with her history that is possible    Suspected vasculitis  Unable to see a most recent chart note from rheumatology  -PTA prednisone    Ascending aortic aneurysm  Mild-moderate aortic regurgitation  Mildly dilated on MRI February 2020  Stress test February 2025 71% EF, valve and aneurysm stable, normal RV  Established with [facility]    Normocytic anemia  Preop hemoglobin 11.9, MCV 85    Gout  -PTA allopurinol 100 mg  "twice daily    Essential hypertension  -PTA losartan 50 mg daily  -PTA hydrochlorothiazide 12.5 mg daily    -PTA pantoprazole (substituted for omeprazole)    History of recurrent DVTs  -Rivaroxaban 10 mg bedtime    Postop right total knee arthroplasty  -Scheduled Tylenol            Diet: Advance Diet as Tolerated: Regular Diet Adult  Discharge Instruction - Regular Diet Adult    DVT Prophylaxis: Defer to primary service  [place] Catheter: Not present  Lines: None     Cardiac Monitoring: None  Code Status: Full Code      Clinically Significant Risk Factors Present on Admission                # Drug Induced Coagulation Defect: home medication list includes an anticoagulant medication    # Hypertension: Home medication list includes antihypertensive(s)      # Anemia: based on hgb <11       # Obesity: Estimated body mass index is 38.96 kg/m  as calculated from the following:    Height as of this encounter: 1.575 m (5' 2\").    Weight as of this encounter: 96.6 kg (213 lb).       # Financial/Environmental Concerns: none         Social Drivers of Health    Physical Activity: Unknown (12/13/2021)    Received from [facility]    Exercise Vital Sign     Days of Exercise per Week: 0 days   Stress: Stress Concern Present (5/23/2023)    Received from [facility]    [employer] [place] [employer] - Occupational Stress Questionnaire     Feeling of Stress : To some extent          Disposition Plan     Medically Ready for Discharge: Anticipated Today         Fortino Cagle MD  Hospitalist Service  [facility]  Securely message with [employer] (more info)  Text page via [employer] Paging/Directory   ______________________________________________________________________    Interval History   Voiding, tolerating p.o.  Afebrile, on 1 L, hemodynamically stable  No new labs      Physical Exam   Vital Signs: Temp: 97.3  F (36.3  C) Temp src: Oral BP: 125/58 Pulse: 74   Resp: 16 SpO2: 94 % O2 Device: Nasal cannula " Oxygen Delivery: 1 LPM  Weight: 213 lbs 0 oz    Awake, alert, appropriate in conversation  Sitting in the chair, no acute distress, although clearly uncomfortable when bending her right knee  Accompanied by a few family members  Breathing comfortably on low-flow nasal cannula  Mild atelectasis left base, otherwise clear  JVP obscured by neck girth  Regular rate and rhythm  Obese abdomen  Postop right knee    Medical Decision Making             Data

## 2025-02-28 NOTE — PROGRESS NOTES
"Orthopedic Progress Note      Assessment: 3 Days Post-Op  S/P Procedure(s):  RIGHT TOTAL KNEE ARTHROPLASTY     Plan:   - Continue PT/OT.   - Weightbearing status: WBAT.  - Anticoagulation: resume Xarelto in addition to SCDs, jarrod stockings and early ambulation.  - Duricef 500 mg BID x 14 days   - Discharge planning: discharge to TCU today pending chest x-ray     Subjective:  Nausea, Vomiting:  No  Chest pain: No  Lightheadedness, Dizziness:  No  Neuro:  Patient denies new onset numbness or paresthesias in right lower extremity     Patient is doing well. Pain right knee and thigh are improving. Continuing to have tightness in chest. Dr. Cagle ordered chest x-ray to [facility] before discharge to TCU today. Patient denies chest tightness or CP currently. Still using supplemental O2. Tolerating oral intake, voiding & pain is controlled with oral medication.      Objective:  /55 (BP Location: Left arm, Patient Position: Supine, Cuff Size: Adult Large)   Pulse 74   Temp 97.6  F (36.4  C) (Oral)   Resp 16   Ht 1.575 m (5' 2\")   Wt 96.6 kg (213 lb)   SpO2 93%   BMI 38.96 kg/m    The patient is A&Ox3. Appears comfortable, sitting in recliner.  Sensation is intact to light touch & equal bilaterally in the L2 through S1 dermatomes.  Calves are soft and non-tender.  Dorsiflexion and plantar flexion is intact bilaterally.  Appropriate flexion and extension of the toes bilaterally.   Brisk capillary refill in the toes bilaterally.   Palpable right dorsalis pedis pulse.  right knee dressing C/D/I.      Pertinent Labs   Lab Results: personally reviewed.   No results found for: \"INR\", \"PROTIME\"  Lab Results   Component Value Date    HGB 10.8 (L) 02/27/2025     No results found for: \"NA\", \"CO2\"      Report completed by:  Eulalia Pace PA-C/[facility]    Date: 2/28/2025  Time: 10:40 AM    "

## 2025-02-28 NOTE — PLAN OF CARE
Goal Outcome Evaluation:  Per report patient ready for discharge, wheelchair transport set up per SW, TCU packet completed with signed prescriptions included.   [facility] DISCHARGE NOTE    Patient discharged to transitional care unit at 5:10 PM via wheel chair. Accompanied by spouse and staff. Discharge instructions reviewed with patient and spouse, opportunity offered to ask questions. Prescriptions sent with patient to fill in TCU packet. All belongings sent with patient.  Copy of AVS, ortho spotlight tool given to patient and spouse, all questions answered.     Zoe Watkins RN         Problem: Adult Inpatient Plan of Care  Goal: Absence of Hospital-Acquired Illness or Injury  Intervention: Identify and Manage Fall Risk  Recent Flowsheet Documentation  Taken 2/28/2025 1700 by Zoe Watkins RN  Safety Promotion/Fall Prevention: safety round/check completed  Taken 2/28/2025 1606 by Zoe Watkins RN  Safety Promotion/Fall Prevention:   activity supervised   assistive device/personal items within reach   clutter free environment maintained   increased rounding and observation   increase visualization of patient   lighting adjusted   mobility aid in reach   nonskid shoes/slippers when out of bed   patient and family education   room near nurse's station   room door open   room organization consistent   safety round/check completed   supervised activity   toileting scheduled  Taken 2/28/2025 1530 by Zoe Watkins RN  Safety Promotion/Fall Prevention: safety round/check completed     Problem: Knee Arthroplasty  Goal: Optimal Pain Control and Function  Intervention: Prevent or Manage Pain  Recent Flowsheet Documentation  Taken 2/28/2025 1606 by Zoe Watkins RN  Pain Management Interventions: medication (see MAR)

## 2025-03-02 ENCOUNTER — DOCUMENTATION ONLY (OUTPATIENT)
Dept: GERIATRICS | Facility: CLINIC | Age: 80
End: 2025-03-02
Payer: COMMERCIAL

## 2025-03-02 NOTE — PROGRESS NOTES
[facility] GERIATRIC SERVICES  Chief Complaint   Patient presents with    [facility] F/U     [place] [facility] Record Number:  [id number]  Place of Service where encounter took place:  [facility] ([facility]) [[id number]]  Code Status:  Full Code     HISTORY:      HPI:  Zoey Pereira  is 79 year old (1945) undergoing physical and occupational therapy.she is with past medical history arthritis, heart murmur, hypertension, GERD excerpted from records  who underwent the right total knee arthroplasty with Dr. Paris on 2/25/25. There were no intraoperative complications and the patient was transferred to the recovery room and later the orthopedic unit in stable condition.       Today she is seen to review vital signs, labs, routine visit and to establish care.  She denied chest pain shortness of breath cough, has congestion and uses Flonase, she did report dryness in her nose and Ocean nasal spray was ordered.  She reports having a small BM currently on senna S and MiraLAX.  She did report positive to Pain right calf venous ultrasound ordered.  Of note she is on Xarelto. Last hemoglobin 10.8 on 2/27/2025.  She rated her pain 7 out of 10 but does get relief with current medications.  Per her report she lives independently with her .      ALLERGIES:Codeine, Alendronate, Hydrocodone, Hydrocodone-acetaminophen, Mold, Morphine, Nsaids, Oxycodone-acetaminophen, Pollen extract, Pravastatin, Short ragweed pollen ext, Simvastatin, Sulfa antibiotics, Adhesive tape, and Conjugated estrogens    PAST MEDICAL HISTORY:   Past Medical History:   Diagnosis Date    Antiplatelet or antithrombotic long-term use     Arthritis     Complication of anesthesia     Difficulty walking     Gastroesophageal reflux disease     Heart disease     Heart murmur     Hypertension     Obese     PONV (postoperative nausea and vomiting)     Sleep disorder 4/26/2010    Thrombosis     Walking troubles        PAST SURGICAL HISTORY:   has a past surgical  history that includes Arthroplasty knee (Right, 2/25/2025).    FAMILY HISTORY: family history is not on file.    SOCIAL HISTORY:  reports that she has never smoked. She has never used smokeless tobacco. She reports that she does not currently use alcohol. She reports that she does not currently use drugs.    ROS:  Constitutional: Negative for activity change, appetite change, fatigue and fever.   HENT: Positive for congestion.  On Flonase  Respiratory: Negative for cough, shortness of breath and wheezing.    Cardiovascular: Negative for chest pain and positive leg swelling.   Gastrointestinal: Negative for abdominal distention, abdominal pain, constipation, diarrhea and nausea.   Genitourinary: Negative for dysuria.   Musculoskeletal: Negative for arthralgia. Negative for back pain.   Skin: Negative for color change and wound.  Right knee surgical incision  Neurological: Negative for dizziness.   Psychiatric/Behavioral: Negative for agitation, behavioral problems and confusion.     Physical Exam:  Constitutional:       Appearance: Patient is well-developed.   HENT:      Head: Normocephalic.   Eyes:      Conjunctiva/sclera: Conjunctivae normal.   Neck:      Musculoskeletal: Normal range of motion.   Cardiovascular:      Rate and Rhythm: Normal rate and regular rhythm.      Heart sounds: Normal heart sounds. No murmur.   Pulmonary:      Effort: No respiratory distress.      Breath sounds: Normal breath sounds. No wheezing or rales.   Abdominal:      General: Bowel sounds are normal. There is no distension.      Palpations: Abdomen is soft.      Tenderness: There is no abdominal tenderness.   Musculoskeletal:       Normal range of motion.  Right knee surgical incision covered with Aquacel dressing   skin:General:        Skin is warm.   Neurological:         Mental Status: Patient is alert and oriented to person, place, and time.   Psychiatric:         Behavior: Behavior normal.     Vitals:BP (!) 119/100   Pulse 93    "Temp 97.9  F (36.6  C)   Resp 18   Ht 1.575 m (5' 2\")   Wt 96.7 kg (213 lb 3.2 oz)   SpO2 95%   BMI 38.99 kg/m   and Body mass index is 38.99 kg/m .    Lab/Diagnostic data:   Recent Results (from the past 240 hours)   Potassium - Pre-Op    Collection Time: 02/25/25  6:29 AM   Result Value Ref Range    Potassium 3.5 3.4 - 5.3 mmol/L   Creatinine    Collection Time: 02/25/25  6:29 AM   Result Value Ref Range    Creatinine 0.96 (H) 0.51 - 0.95 mg/dL    GFR Estimate 60 (L) >60 mL/min/1.73m2   Hemoglobin    Collection Time: 02/26/25  6:17 AM   Result Value Ref Range    Hemoglobin 10.2 (L) 11.7 - 15.7 g/dL   Glucose    Collection Time: 02/26/25  6:17 AM   Result Value Ref Range    Glucose 106 (H) 70 - 99 mg/dL    Patient Fasting > 8hrs? Yes    N terminal pro BNP outpatient    Collection Time: 02/26/25  6:17 AM   Result Value Ref Range    N Terminal Pro BNP Outpatient 280 0 - 1,800 pg/mL   Hemoglobin    Collection Time: 02/27/25  6:42 AM   Result Value Ref Range    Hemoglobin 10.8 (L) 11.7 - 15.7 g/dL   Glucose    Collection Time: 02/27/25  6:42 AM   Result Value Ref Range    Glucose 112 (H) 70 - 99 mg/dL    Patient Fasting > 8hrs? Yes         MEDICATIONS:  MED REC REQUIRED  Post Medication Reconciliation Status: discharge medications reconciled, continue medications without change          Review of your medicines            Accurate as of March 3, 2025 11:43 AM. If you have any questions, ask your nurse or doctor.                CONTINUE these medicines which have NOT CHANGED        Dose / Directions   acetaminophen 325 MG tablet  Commonly known as: TYLENOL  Used for: Status post joint replacement      Dose: 975 mg  Take 3 tablets (975 mg) by mouth every 6 hours.  Quantity: 100 tablet  Refills: 0     allopurinol 100 MG tablet  Commonly known as: ZYLOPRIM      Dose: 100 mg  Take 100 mg by mouth 2 times daily.  Refills: 0     calcium carbonate-vitamin D 600-10 MG-MCG per tablet  Commonly known as: CALTRATE      Dose: " 1 tablet  Take 1 tablet by mouth 2 times daily.  Refills: 0     cefadroxil 500 MG capsule  Commonly known as: DURICEF  Used for: Status post joint replacement      Dose: 500 mg  Take 1 capsule (500 mg) by mouth 2 times daily.  Quantity: 28 capsule  Refills: 0     fluticasone 50 MCG/ACT nasal spray  Commonly known as: FLONASE      Dose: 2 spray  Spray 2 sprays into both nostrils daily.  Refills: 0     gabapentin 100 MG capsule  Commonly known as: NEURONTIN  Used for: Status post joint replacement      Dose: 100 mg  Take 1 capsule (100 mg) by mouth 2 times daily.  Quantity: 30 capsule  Refills: 0     ketoconazole 2 % external cream  Commonly known as: NIZORAL      Apply topically daily as needed for itching.  Refills: 0     losartan-hydrochlorothiazide 50-12.5 MG tablet  Commonly known as: HYZAAR      Dose: 1 tablet  Take 1 tablet by mouth daily.  Refills: 0     magnesium oxide 400 MG Caps      Dose: 400 mg  Take 400 mg by mouth daily.  Refills: 0     meclizine 25 MG tablet  Commonly known as: ANTIVERT      Dose: 25 mg  Take 25 mg by mouth 3 times daily as needed for dizziness.  Refills: 0     Multi-vitamin per tablet  Generic drug: multivitamin w/minerals      Dose: 1 tablet  Take 1 tablet by mouth daily.  Refills: 0     nystatin 425514 UNIT/GM external powder  Commonly known as: MYCOSTATIN      Apply topically 3 times daily as needed.  Refills: 0     olopatadine 0.2 % ophthalmic solution  Commonly known as: PATADAY      Dose: 1 drop  Place 1 drop into both eyes daily as needed.  Refills: 0     omeprazole 20 MG tablet      Dose: 20 mg  Take 20 mg by mouth 2 times daily.  Refills: 0     ondansetron 8 MG tablet  Commonly known as: ZOFRAN      Dose: 8 mg  Take 8 mg by mouth every 8 hours as needed for nausea.  Refills: 0     oxyCODONE 5 MG tablet  Commonly known as: ROXICODONE  Used for: Status post joint replacement      Dose: 5 mg  Take 1 tablet (5 mg) by mouth every 4 hours as needed for severe pain.  Quantity: 26  tablet  Refills: 0     polyethylene glycol 17 GM/Dose powder  Commonly known as: MIRALAX  Used for: Status post joint replacement      Dose: 17 g  Take 17 g by mouth daily.  Quantity: 510 g  Refills: 0     potassium chloride ER 10 MEQ CR capsule  Commonly known as: MICRO-K      Dose: 10 mEq  Take 10 mEq by mouth 3 times daily.  Refills: 0     predniSONE 1 MG tablet  Commonly known as: DELTASONE      Dose: 2 mg  Take 2 mg by mouth daily.  Refills: 0     rivaroxaban ANTICOAGULANT 10 MG Tabs tablet  Commonly known as: XARELTO      Take by mouth daily (with dinner).  Refills: 0     senna-docusate 8.6-50 MG tablet  Commonly known as: SENOKOT-S/PERICOLACE  Used for: Status post joint replacement      Dose: 1 tablet  Take 1 tablet by mouth 2 times daily.  Quantity: 60 tablet  Refills: 0     sodium chloride 0.65 % nasal spray  Commonly known as: OCEAN      Dose: 1 spray  Spray 1 spray into both nostrils every 2 hours as needed for congestion (dryness).  Refills: 0     vitamin B-12 250 MCG tablet  Commonly known as: CYANOCOBALAMIN      Dose: 250 mcg  Take 250 mcg by mouth daily.  Refills: 0     Vitamin D3 25 mcg (1000 units) tablet  Commonly known as: CHOLECALCIFEROL      Dose: 1 tablet  Take 1 tablet by mouth daily.  Refills: 0              ASSESSMENT/PLAN  Encounter Diagnoses   Name Primary?    Pain management Yes    Physical deconditioning     Status post joint replacement        Pain management Tylenol every 6 hours, as needed oxycodone    Polyosteoarthritis on prednisone    Anticoagulation management continue Xarelto    Gout on allopurinol    Infection on cefadroxil until 3/14/2025    Neuropathy continue gabapenti    hypertension on losartan    Dizziness as needed meclizine    GERD continue omeprazole    Nausea as needed Zofran    Right total knee arthroplasty follow-up with orthopedics, pain management    Physical deconditioning PT OT    Right calf pain venous ultrasound ordered    Lower extremity edema compression on  during the day off at night    Electronically signed by: Rosy Zamarripa CNP

## 2025-03-03 ENCOUNTER — TRANSITIONAL CARE UNIT VISIT (OUTPATIENT)
Dept: GERIATRICS | Facility: CLINIC | Age: 80
End: 2025-03-03
Payer: COMMERCIAL

## 2025-03-03 ENCOUNTER — LAB REQUISITION (OUTPATIENT)
Dept: LAB | Facility: CLINIC | Age: 80
End: 2025-03-03
Payer: COMMERCIAL

## 2025-03-03 VITALS
HEIGHT: 62 IN | HEART RATE: 93 BPM | SYSTOLIC BLOOD PRESSURE: 119 MMHG | DIASTOLIC BLOOD PRESSURE: 100 MMHG | TEMPERATURE: 97.9 F | OXYGEN SATURATION: 95 % | RESPIRATION RATE: 18 BRPM | BODY MASS INDEX: 39.23 KG/M2 | WEIGHT: 213.2 LBS

## 2025-03-03 DIAGNOSIS — Z96.60 STATUS POST JOINT REPLACEMENT: ICD-10-CM

## 2025-03-03 DIAGNOSIS — Z47.1 AFTERCARE FOLLOWING JOINT REPLACEMENT SURGERY: ICD-10-CM

## 2025-03-03 DIAGNOSIS — Z51.81 ENCOUNTER FOR THERAPEUTIC DRUG LEVEL MONITORING: ICD-10-CM

## 2025-03-03 DIAGNOSIS — R52 PAIN MANAGEMENT: Primary | ICD-10-CM

## 2025-03-03 DIAGNOSIS — M17.11 UNILATERAL PRIMARY OSTEOARTHRITIS, RIGHT KNEE: ICD-10-CM

## 2025-03-03 DIAGNOSIS — R53.81 PHYSICAL DECONDITIONING: ICD-10-CM

## 2025-03-03 PROBLEM — E87.6 HYPOKALEMIA: Status: ACTIVE | Noted: 2021-11-01

## 2025-03-03 PROBLEM — J12.82 PNEUMONIA DUE TO COVID-19 VIRUS: Status: ACTIVE | Noted: 2021-11-01

## 2025-03-03 PROBLEM — R53.83 FATIGUE: Status: ACTIVE | Noted: 2025-03-03

## 2025-03-03 PROBLEM — R32 URINARY INCONTINENCE: Status: ACTIVE | Noted: 2025-03-03

## 2025-03-03 PROBLEM — M10.00 IDIOPATHIC GOUT: Status: ACTIVE | Noted: 2018-08-07

## 2025-03-03 PROBLEM — N18.31 STAGE 3A CHRONIC KIDNEY DISEASE (H): Status: ACTIVE | Noted: 2021-11-01

## 2025-03-03 PROBLEM — U07.1 PNEUMONIA DUE TO COVID-19 VIRUS: Status: ACTIVE | Noted: 2021-11-01

## 2025-03-03 PROBLEM — R09.02 HYPOXIA: Status: ACTIVE | Noted: 2021-11-01

## 2025-03-03 PROBLEM — M17.9 OSTEOARTHRITIS OF KNEE: Status: ACTIVE | Noted: 2020-02-11

## 2025-03-03 PROBLEM — R14.0 ABDOMINAL BLOATING: Status: ACTIVE | Noted: 2025-03-03

## 2025-03-03 PROBLEM — R60.0 EDEMA OF LOWER EXTREMITY: Status: ACTIVE | Noted: 2025-03-03

## 2025-03-03 PROBLEM — D68.2 HEREDITARY DEFICIENCY OF OTHER CLOTTING FACTORS (H): Status: ACTIVE | Noted: 2025-02-11

## 2025-03-03 PROBLEM — R10.2 SUPRAPUBIC PAIN: Status: ACTIVE | Noted: 2025-03-03
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
## 2025-03-03 PROBLEM — N83.292 COMPLEX CYST OF LEFT OVARY: Status: ACTIVE | Noted: 2025-03-03

## 2025-03-03 PROBLEM — N17.9 AKI (ACUTE KIDNEY INJURY): Status: ACTIVE | Noted: 2021-11-01

## 2025-03-03 PROBLEM — D51.9 B12 DEFICIENCY ANEMIA: Status: ACTIVE | Noted: 2025-03-03

## 2025-03-03 PROBLEM — E83.42 HYPOMAGNESEMIA: Status: ACTIVE | Noted: 2021-11-01

## 2025-03-03 PROBLEM — R19.09 OTHER INTRA-ABDOMINAL AND PELVIC SWELLING, MASS AND LUMP: Status: ACTIVE | Noted: 2025-03-03

## 2025-03-03 PROCEDURE — 99309 SBSQ NF CARE MODERATE MDM 30: CPT | Performed by: NURSE PRACTITIONER

## 2025-03-03 NOTE — LETTER
3/3/2025      Zoey Pereira  [address] [id number]         HEALTH GERIATRIC SERVICES  Chief Complaint   Patient presents with     [facility] F/U     [place] Medical Record Number:  [id number]  Place of Service where encounter took place:  [facility] ([facility]) [[id number]]  Code Status:  Full Code     HISTORY:      HPI:  Zoey Pereira  is 79 year old (1945) undergoing physical and occupational therapy.she is with past medical history arthritis, heart murmur, hypertension, GERD excerpted from records  who underwent the right total knee arthroplasty with Dr. Paris on 2/25/25. There were no intraoperative complications and the patient was transferred to the recovery room and later the orthopedic unit in stable condition.       Today she is seen to review vital signs, labs, routine visit and to establish care.  She denied chest pain shortness of breath cough, has congestion and uses Flonase, she did report dryness in her nose and Ocean nasal spray was ordered.  She reports having a small BM currently on senna S and MiraLAX.  She did report positive to Pain right calf venous ultrasound ordered.  Of note she is on Xarelto. Last hemoglobin 10.8 on 2/27/2025.  She rated her pain 7 out of 10 but does get relief with current medications.  Per her report she lives independently with her .      ALLERGIES:Codeine, Alendronate, Hydrocodone, Hydrocodone-acetaminophen, Mold, Morphine, Nsaids, Oxycodone-acetaminophen, Pollen extract, Pravastatin, Short ragweed pollen ext, Simvastatin, Sulfa antibiotics, Adhesive tape, and Conjugated estrogens    PAST MEDICAL HISTORY:   Past Medical History:   Diagnosis Date     Antiplatelet or antithrombotic long-term use      Arthritis      Complication of anesthesia      Difficulty walking      Gastroesophageal reflux disease      Heart disease      Heart murmur      Hypertension      Obese      PONV (postoperative nausea and vomiting)      Sleep disorder  4/26/2010     Thrombosis      Walking troubles        PAST SURGICAL HISTORY:   has a past surgical history that includes Arthroplasty knee (Right, 2/25/2025).    FAMILY HISTORY: family history is not on file.    SOCIAL HISTORY:  reports that she has never smoked. She has never used smokeless tobacco. She reports that she does not currently use alcohol. She reports that she does not currently use drugs.    ROS:  Constitutional: Negative for activity change, appetite change, fatigue and fever.   HENT: Positive for congestion.  On Flonase  Respiratory: Negative for cough, shortness of breath and wheezing.    Cardiovascular: Negative for chest pain and positive leg swelling.   Gastrointestinal: Negative for abdominal distention, abdominal pain, constipation, diarrhea and nausea.   Genitourinary: Negative for dysuria.   Musculoskeletal: Negative for arthralgia. Negative for back pain.   Skin: Negative for color change and wound.  Right knee surgical incision  Neurological: Negative for dizziness.   Psychiatric/Behavioral: Negative for agitation, behavioral problems and confusion.     Physical Exam:  Constitutional:       Appearance: Patient is well-developed.   HENT:      Head: Normocephalic.   Eyes:      Conjunctiva/sclera: Conjunctivae normal.   Neck:      Musculoskeletal: Normal range of motion.   Cardiovascular:      Rate and Rhythm: Normal rate and regular rhythm.      Heart sounds: Normal heart sounds. No murmur.   Pulmonary:      Effort: No respiratory distress.      Breath sounds: Normal breath sounds. No wheezing or rales.   Abdominal:      General: Bowel sounds are normal. There is no distension.      Palpations: Abdomen is soft.      Tenderness: There is no abdominal tenderness.   Musculoskeletal:       Normal range of motion.  Right knee surgical incision covered with Aquacel dressing   skin:General:        Skin is warm.   Neurological:         Mental Status: Patient is alert and oriented to person, place,  "and time.   Psychiatric:         Behavior: Behavior normal.     Vitals:BP (!) 119/100   Pulse 93   Temp 97.9  F (36.6  C)   Resp 18   Ht 1.575 m (5' 2\")   Wt 96.7 kg (213 lb 3.2 oz)   SpO2 95%   BMI 38.99 kg/m   and Body mass index is 38.99 kg/m .    Lab/Diagnostic data:   Recent Results (from the past 240 hours)   Potassium - Pre-Op    Collection Time: 02/25/25  6:29 AM   Result Value Ref Range    Potassium 3.5 3.4 - 5.3 mmol/L   Creatinine    Collection Time: 02/25/25  6:29 AM   Result Value Ref Range    Creatinine 0.96 (H) 0.51 - 0.95 mg/dL    GFR Estimate 60 (L) >60 mL/min/1.73m2   Hemoglobin    Collection Time: 02/26/25  6:17 AM   Result Value Ref Range    Hemoglobin 10.2 (L) 11.7 - 15.7 g/dL   Glucose    Collection Time: 02/26/25  6:17 AM   Result Value Ref Range    Glucose 106 (H) 70 - 99 mg/dL    Patient Fasting > 8hrs? Yes    N terminal pro BNP outpatient    Collection Time: 02/26/25  6:17 AM   Result Value Ref Range    N Terminal Pro BNP Outpatient 280 0 - 1,800 pg/mL   Hemoglobin    Collection Time: 02/27/25  6:42 AM   Result Value Ref Range    Hemoglobin 10.8 (L) 11.7 - 15.7 g/dL   Glucose    Collection Time: 02/27/25  6:42 AM   Result Value Ref Range    Glucose 112 (H) 70 - 99 mg/dL    Patient Fasting > 8hrs? Yes         MEDICATIONS:  MED REC REQUIRED  Post Medication Reconciliation Status: discharge medications reconciled, continue medications without change          Review of your medicines            Accurate as of March 3, 2025 11:43 AM. If you have any questions, ask your nurse or doctor.                CONTINUE these medicines which have NOT CHANGED        Dose / Directions   acetaminophen 325 MG tablet  Commonly known as: TYLENOL  Used for: Status post joint replacement      Dose: 975 mg  Take 3 tablets (975 mg) by mouth every 6 hours.  Quantity: 100 tablet  Refills: 0     allopurinol 100 MG tablet  Commonly known as: ZYLOPRIM      Dose: 100 mg  Take 100 mg by mouth 2 times " daily.  Refills: 0     calcium carbonate-vitamin D 600-10 MG-MCG per tablet  Commonly known as: CALTRATE      Dose: 1 tablet  Take 1 tablet by mouth 2 times daily.  Refills: 0     cefadroxil 500 MG capsule  Commonly known as: DURICEF  Used for: Status post joint replacement      Dose: 500 mg  Take 1 capsule (500 mg) by mouth 2 times daily.  Quantity: 28 capsule  Refills: 0     fluticasone 50 MCG/ACT nasal spray  Commonly known as: FLONASE      Dose: 2 spray  Spray 2 sprays into both nostrils daily.  Refills: 0     gabapentin 100 MG capsule  Commonly known as: NEURONTIN  Used for: Status post joint replacement      Dose: 100 mg  Take 1 capsule (100 mg) by mouth 2 times daily.  Quantity: 30 capsule  Refills: 0     ketoconazole 2 % external cream  Commonly known as: NIZORAL      Apply topically daily as needed for itching.  Refills: 0     losartan-hydrochlorothiazide 50-12.5 MG tablet  Commonly known as: HYZAAR      Dose: 1 tablet  Take 1 tablet by mouth daily.  Refills: 0     magnesium oxide 400 MG Caps      Dose: 400 mg  Take 400 mg by mouth daily.  Refills: 0     meclizine 25 MG tablet  Commonly known as: ANTIVERT      Dose: 25 mg  Take 25 mg by mouth 3 times daily as needed for dizziness.  Refills: 0     Multi-vitamin per tablet  Generic drug: multivitamin w/minerals      Dose: 1 tablet  Take 1 tablet by mouth daily.  Refills: 0     nystatin 358043 UNIT/GM external powder  Commonly known as: MYCOSTATIN      Apply topically 3 times daily as needed.  Refills: 0     olopatadine 0.2 % ophthalmic solution  Commonly known as: PATADAY      Dose: 1 drop  Place 1 drop into both eyes daily as needed.  Refills: 0     omeprazole 20 MG tablet      Dose: 20 mg  Take 20 mg by mouth 2 times daily.  Refills: 0     ondansetron 8 MG tablet  Commonly known as: ZOFRAN      Dose: 8 mg  Take 8 mg by mouth every 8 hours as needed for nausea.  Refills: 0     oxyCODONE 5 MG tablet  Commonly known as: ROXICODONE  Used for: Status post joint  replacement      Dose: 5 mg  Take 1 tablet (5 mg) by mouth every 4 hours as needed for severe pain.  Quantity: 26 tablet  Refills: 0     polyethylene glycol 17 GM/Dose powder  Commonly known as: MIRALAX  Used for: Status post joint replacement      Dose: 17 g  Take 17 g by mouth daily.  Quantity: 510 g  Refills: 0     potassium chloride ER 10 MEQ CR capsule  Commonly known as: MICRO-K      Dose: 10 mEq  Take 10 mEq by mouth 3 times daily.  Refills: 0     predniSONE 1 MG tablet  Commonly known as: DELTASONE      Dose: 2 mg  Take 2 mg by mouth daily.  Refills: 0     rivaroxaban ANTICOAGULANT 10 MG Tabs tablet  Commonly known as: XARELTO      Take by mouth daily (with dinner).  Refills: 0     senna-docusate 8.6-50 MG tablet  Commonly known as: SENOKOT-S/PERICOLACE  Used for: Status post joint replacement      Dose: 1 tablet  Take 1 tablet by mouth 2 times daily.  Quantity: 60 tablet  Refills: 0     sodium chloride 0.65 % nasal spray  Commonly known as: OCEAN      Dose: 1 spray  Spray 1 spray into both nostrils every 2 hours as needed for congestion (dryness).  Refills: 0     vitamin B-12 250 MCG tablet  Commonly known as: CYANOCOBALAMIN      Dose: 250 mcg  Take 250 mcg by mouth daily.  Refills: 0     Vitamin D3 25 mcg (1000 units) tablet  Commonly known as: CHOLECALCIFEROL      Dose: 1 tablet  Take 1 tablet by mouth daily.  Refills: 0              ASSESSMENT/PLAN  Encounter Diagnoses   Name Primary?     Pain management Yes     Physical deconditioning      Status post joint replacement        Pain management Tylenol every 6 hours, as needed oxycodone    Polyosteoarthritis on prednisone    Anticoagulation management continue Xarelto    Gout on allopurinol    Infection on cefadroxil until 3/14/2025    Neuropathy continue gabapenti    hypertension on losartan    Dizziness as needed meclizine    GERD continue omeprazole    Nausea as needed Zofran    Right total knee arthroplasty follow-up with orthopedics, pain  management    Physical deconditioning PT OT    Right calf pain venous ultrasound ordered    Lower extremity edema compression on during the day off at night    Electronically signed by: Rosy Zamarripa CNP       Sincerely,        Rosy Zamarripa CNP    Electronically signed

## 2025-03-04 ENCOUNTER — TELEPHONE (OUTPATIENT)
Dept: GERIATRICS | Facility: CLINIC | Age: 80
End: 2025-03-04

## 2025-03-04 LAB
ANION GAP SERPL CALCULATED.3IONS-SCNC: 10 MMOL/L (ref 7–15)
BUN SERPL-MCNC: 17.7 MG/DL (ref 8–23)
CALCIUM SERPL-MCNC: 10 MG/DL (ref 8.8–10.4)
CHLORIDE SERPL-SCNC: 104 MMOL/L (ref 98–107)
CREAT SERPL-MCNC: 1.02 MG/DL (ref 0.51–0.95)
EGFRCR SERPLBLD CKD-EPI 2021: 55 ML/MIN/1.73M2
ERYTHROCYTE [DISTWIDTH] IN BLOOD BY AUTOMATED COUNT: 15.3 % (ref 10–15)
GLUCOSE SERPL-MCNC: 118 MG/DL (ref 70–99)
HCO3 SERPL-SCNC: 28 MMOL/L (ref 22–29)
HCT VFR BLD AUTO: 34.5 % (ref 35–47)
HGB BLD-MCNC: 10.7 G/DL (ref 11.7–15.7)
MCH RBC QN AUTO: 28.2 PG (ref 26.5–33)
MCHC RBC AUTO-ENTMCNC: 31 G/DL (ref 31.5–36.5)
MCV RBC AUTO: 91 FL (ref 78–100)
PLATELET # BLD AUTO: 298 10E3/UL (ref 150–450)
POTASSIUM SERPL-SCNC: 4.1 MMOL/L (ref 3.4–5.3)
RBC # BLD AUTO: 3.79 10E6/UL (ref 3.8–5.2)
SODIUM SERPL-SCNC: 142 MMOL/L (ref 135–145)
WBC # BLD AUTO: 8.4 10E3/UL (ref 4–11)

## 2025-03-04 PROCEDURE — 85027 COMPLETE CBC AUTOMATED: CPT | Mod: ORL | Performed by: NURSE PRACTITIONER

## 2025-03-04 PROCEDURE — 80048 BASIC METABOLIC PNL TOTAL CA: CPT | Mod: ORL | Performed by: NURSE PRACTITIONER

## 2025-03-04 PROCEDURE — P9604 ONE-WAY ALLOW PRORATED TRIP: HCPCS | Mod: ORL | Performed by: NURSE PRACTITIONER

## 2025-03-04 PROCEDURE — 36415 COLL VENOUS BLD VENIPUNCTURE: CPT | Mod: ORL | Performed by: NURSE PRACTITIONER

## 2025-03-05 NOTE — PROGRESS NOTES
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  Continue incisional care  WBAT    Pain management  Cont tylenol and oxycodone  Using ice  Reporting improvement    RLE swelling  Doppler neg  Compression and elevation  No diuretic for now    Vasculitis /temporal arteritis on chronic prednisone  Reports she is being tapered by [place]  Doing well  History of mild to moderate aortic regurgitation as well as ascending aortic aneurysm  Follow-up [facility] cardiology as scheduled    Gout  On zyloprim    Normocytic anemia  R/c Hg stable at 10.7    History of recurrent DVTs  Repeat doppler of rt leg neg    Hypertension stable on meds  Cont hyzaar.    Generalized weakness  Cont PT  Has a ortho appt this week and hoping to discharge soon  Daughter at bedside updated      History     Patient is a very pleasant 79 year old female who is admitted to TCU patient was admitted  For elective total knee replacement of the right knee on 2/25/2025  Postoperatively discharge weightbearing as tolerated to the TCU  Reporting pain is improving  Has swelling in legs and wearing compression.      Past Medical & Surgical History     PAST MEDICAL HISTORY:   Past Medical History:   Diagnosis Date    Antiplatelet or antithrombotic long-term use     Arthritis     Complication of anesthesia     Difficulty walking     Gastroesophageal reflux disease     Heart disease     Heart murmur     Hypertension     Obese     PONV (postoperative nausea and vomiting)     Sleep disorder 4/26/2010    Thrombosis     Walking troubles       PAST SURGICAL HISTORY:   has a past surgical history that includes Arthroplasty knee (Right, 2/25/2025).      Past Social History     Reviewed,  reports that she has never smoked. She has never used smokeless  tobacco. She reports that she does not currently use alcohol. She reports that she does not currently use drugs.    Family History     Reviewed, and family history is not on file.    Medication List     Current Outpatient Medications   Medication Sig Dispense Refill    acetaminophen (TYLENOL) 325 MG tablet Take 3 tablets (975 mg) by mouth every 6 hours. 100 tablet 0    allopurinol (ZYLOPRIM) 100 MG tablet Take 100 mg by mouth 2 times daily.      calcium carbonate-vitamin D (CALTRATE) 600-10 MG-MCG per tablet Take 1 tablet by mouth 2 times daily.      cefadroxil (DURICEF) 500 MG capsule Take 1 capsule (500 mg) by mouth 2 times daily. 28 capsule 0    fluticasone (FLONASE) 50 MCG/ACT nasal spray Spray 2 sprays into both nostrils daily.      gabapentin (NEURONTIN) 100 MG capsule Take 1 capsule (100 mg) by mouth 2 times daily. 30 capsule 0    ketoconazole (NIZORAL) 2 % external cream Apply topically daily as needed for itching.      losartan-hydrochlorothiazide (HYZAAR) 50-12.5 MG tablet Take 1 tablet by mouth daily.      magnesium oxide 400 MG CAPS Take 400 mg by mouth daily.      meclizine (ANTIVERT) 25 MG tablet Take 25 mg by mouth 3 times daily as needed for dizziness.      multivitamin w/minerals (MULTI-VITAMIN) tablet Take 1 tablet by mouth daily.      nystatin (MYCOSTATIN) 520111 UNIT/GM external powder Apply topically 3 times daily as needed.      olopatadine (PATADAY) 0.2 % ophthalmic solution Place 1 drop into both eyes daily as needed.      omeprazole 20 MG tablet Take 20 mg by mouth 2 times daily.      ondansetron (ZOFRAN) 8 MG tablet Take 8 mg by mouth every 8 hours as needed for nausea.      oxyCODONE (ROXICODONE) 5 MG tablet Take 1 tablet (5 mg) by mouth every 4 hours as needed for severe pain. 26 tablet 0    polyethylene glycol (MIRALAX) 17 GM/Dose powder Take 17 g by mouth daily. 510 g 0    potassium chloride ER (MICRO-K) 10 MEQ CR capsule Take 10 mEq by mouth 3 times daily.      predniSONE (DELTASONE) 1  MG tablet Take 2 mg by mouth daily.      rivaroxaban ANTICOAGULANT (XARELTO) 10 MG TABS tablet Take by mouth daily (with dinner).      senna-docusate (SENOKOT-S/PERICOLACE) 8.6-50 MG tablet Take 1 tablet by mouth 2 times daily. 60 tablet 0    sodium chloride (OCEAN) 0.65 % nasal spray Spray 1 spray into both nostrils every 2 hours as needed for congestion (dryness).      vitamin B-12 (CYANOCOBALAMIN) 250 MCG tablet Take 250 mcg by mouth daily.      Vitamin D3 (CHOLECALCIFEROL) 25 mcg (1000 units) tablet Take 1 tablet by mouth daily.       No current facility-administered medications for this visit.      MED REC REQUIRED  Post Medication Reconciliation Status: discharge medications reconciled, continue medications without change       Allergies     Allergies   Allergen Reactions    Codeine Dizziness and Difficulty breathing    Alendronate Other (See Comments)     Severe reflux    Hydrocodone Nausea    Hydrocodone-Acetaminophen Nausea and Vomiting    Mold     Morphine Nausea    Nsaids Other (See Comments)     Pt on xarelton    Oxycodone-Acetaminophen Dizziness and Nausea    Pollen Extract     Pravastatin Muscle Pain (Myalgia)    Short Ragweed Pollen Ext Other (See Comments)     Sneezing, congestion    Simvastatin Muscle Pain (Myalgia)    Sulfa Antibiotics Hives    Adhesive Tape Rash    Conjugated Estrogens Other (See Comments) and Rash     Labia irritation       Review of Systems   A comprehensive review of 14 systems was done. Pertinent findings noted here and in history of present illness. All the rest negative.  Constitutional: Negative.  Negative for fever, chills, she has  activity change, appetite change and fatigue.   HENT: Negative for congestion and facial swelling.    Eyes: Negative for photophobia, redness and visual disturbance.   Respiratory: Negative for cough and chest tightness.    Cardiovascular: Negative for chest pain, palpitations and rt >L leg swelling.   Gastrointestinal: Negative for nausea,  "diarrhea, constipation, blood in stool and abdominal distention.   Genitourinary: Negative.    Musculoskeletal: reporting pain in knee and improving  Skin: Negative.    Neurological: Negative for dizziness, tremors, syncope, weakness, light-headedness and headaches.   Hematological: Does not bruise/bleed easily.   Psychiatric/Behavioral: Negative.        Physical Exam   BP (!) 153/55   Pulse 85   Temp 96.8  F (36  C)   Resp 18   Ht 1.575 m (5' 2\")   Wt 96.6 kg (213 lb)   SpO2 (!) 91%   BMI 38.96 kg/m       Constitutional: Oriented to person, place, and time and appears well-developed.   HEENT:  Normocephalic and atraumatic.  Eyes: Conjunctivae and EOM are normal. Pupils are equal, round, and reactive to light. No discharge.  No scleral icterus. Nose normal. Mouth/Throat: Oropharynx is clear and moist. No oropharyngeal exudate.    NECK: Normal range of motion. Neck supple. No JVD present. No tracheal deviation present. No thyromegaly present.   CARDIOVASCULAR: Normal rate, regular rhythm and intact distal pulses.  Exam reveals no gallop and no friction rub.  Systolic murmur present.  PULMONARY: Effort normal and breath sounds normal. No respiratory distress.No Wheezing or rales.  ABDOMEN: Soft. Bowel sounds are normal. No distension and no mass.  There is no tenderness. There is no rebound and no guarding. No HSM.  MUSCULOSKELETAL: Normal range of motion. Mild kyphosis, no tenderness.  Rt knee with intact incision ,  LYMPH NODES: Has no cervical, supraclavicular, axillary and groin adenopathy.   NEUROLOGICAL: Alert and oriented to person, place, and time. No cranial nerve deficit.  Normal muscle tone. Coordination normal.   GENITOURINARY: Deferred exam.  SKIN: Skin is warm and dry. No rash noted. No erythema. No pallor.   EXTREMITIES: No cyanosis, no clubbing,trace to 1+ edema. No Deformity.  PSYCHIATRIC: Normal mood, affect and behavior.      Lab Results     Recent Results (from the past 240 hours) "   Potassium - Pre-Op    Collection Time: 02/25/25  6:29 AM   Result Value Ref Range    Potassium 3.5 3.4 - 5.3 mmol/L   Creatinine    Collection Time: 02/25/25  6:29 AM   Result Value Ref Range    Creatinine 0.96 (H) 0.51 - 0.95 mg/dL    GFR Estimate 60 (L) >60 mL/min/1.73m2   Hemoglobin    Collection Time: 02/26/25  6:17 AM   Result Value Ref Range    Hemoglobin 10.2 (L) 11.7 - 15.7 g/dL   Glucose    Collection Time: 02/26/25  6:17 AM   Result Value Ref Range    Glucose 106 (H) 70 - 99 mg/dL    Patient Fasting > 8hrs? Yes    N terminal pro BNP outpatient    Collection Time: 02/26/25  6:17 AM   Result Value Ref Range    N Terminal Pro BNP Outpatient 280 0 - 1,800 pg/mL   Hemoglobin    Collection Time: 02/27/25  6:42 AM   Result Value Ref Range    Hemoglobin 10.8 (L) 11.7 - 15.7 g/dL   Glucose    Collection Time: 02/27/25  6:42 AM   Result Value Ref Range    Glucose 112 (H) 70 - 99 mg/dL    Patient Fasting > 8hrs? Yes    CBC with platelets    Collection Time: 03/04/25  9:01 AM   Result Value Ref Range    WBC Count 8.4 4.0 - 11.0 10e3/uL    RBC Count 3.79 (L) 3.80 - 5.20 10e6/uL    Hemoglobin 10.7 (L) 11.7 - 15.7 g/dL    Hematocrit 34.5 (L) 35.0 - 47.0 %    MCV 91 78 - 100 fL    MCH 28.2 26.5 - 33.0 pg    MCHC 31.0 (L) 31.5 - 36.5 g/dL    RDW 15.3 (H) 10.0 - 15.0 %    Platelet Count 298 150 - 450 10e3/uL   Glucose (OUTREACH)    Collection Time: 03/04/25  9:01 AM   Result Value Ref Range    Glucose 118 (H) 70 - 99 mg/dL   Basic Metabolic Panel No Glucose (OUTREACH)    Collection Time: 03/04/25  9:01 AM   Result Value Ref Range    Sodium 142 135 - 145 mmol/L    Potassium 4.1 3.4 - 5.3 mmol/L    Chloride 104 98 - 107 mmol/L    Carbon Dioxide (CO2) 28 22 - 29 mmol/L    Anion Gap 10 7 - 15 mmol/L    Urea Nitrogen 17.7 8.0 - 23.0 mg/dL    Creatinine 1.02 (H) 0.51 - 0.95 mg/dL    GFR Estimate 55 (L) >60 mL/min/1.73m2    Calcium 10.0 8.8 - 10.4 mg/dL           Electronically signed by    Aarti Judge MD

## 2025-03-06 ENCOUNTER — TRANSITIONAL CARE UNIT VISIT (OUTPATIENT)
Dept: GERIATRICS | Facility: CLINIC | Age: 80
End: 2025-03-06
Payer: COMMERCIAL

## 2025-03-06 VITALS
TEMPERATURE: 96.8 F | BODY MASS INDEX: 39.2 KG/M2 | DIASTOLIC BLOOD PRESSURE: 55 MMHG | OXYGEN SATURATION: 91 % | SYSTOLIC BLOOD PRESSURE: 153 MMHG | HEIGHT: 62 IN | RESPIRATION RATE: 18 BRPM | WEIGHT: 213 LBS | HEART RATE: 85 BPM

## 2025-03-06 DIAGNOSIS — D51.8 OTHER VITAMIN B12 DEFICIENCY ANEMIA: ICD-10-CM

## 2025-03-06 DIAGNOSIS — Z96.651 STATUS POST TOTAL RIGHT KNEE REPLACEMENT: Primary | ICD-10-CM

## 2025-03-06 DIAGNOSIS — I35.1 NONRHEUMATIC AORTIC VALVE INSUFFICIENCY: ICD-10-CM

## 2025-03-06 DIAGNOSIS — M31.6 TEMPORAL ARTERITIS (H): ICD-10-CM

## 2025-03-06 DIAGNOSIS — M10.00 IDIOPATHIC GOUT, UNSPECIFIED CHRONICITY, UNSPECIFIED SITE: ICD-10-CM

## 2025-03-06 DIAGNOSIS — I10 PRIMARY HYPERTENSION: ICD-10-CM

## 2025-03-06 NOTE — LETTER
3/6/2025      Zoey Pereira  [address] [facility] [address]         [facility] GERIATRIC SERVICES       Patient Zoey Pereira  MRN: [id number]        Reason for Visit     Chief Complaint   Patient presents with     RECHECK     INITIAL       Code Status     CPR/Full code     Assessment/plan     Status post right total knee arthroplasty on 2/25/2025  Continue incisional care  WBAT    Pain management  Cont tylenol and oxycodone  Using ice  Reporting improvement    RLE swelling  Doppler neg  Compression and elevation  No diuretic for now    Vasculitis /temporal arteritis on chronic prednisone  Reports she is being tapered by [place]  Doing well  History of mild to moderate aortic regurgitation as well as ascending aortic aneurysm  Follow-up [facility] cardiology as scheduled    Gout  On zyloprim    Normocytic anemia  R/c Hg stable at 10.7    History of recurrent DVTs  Repeat doppler of rt leg neg    Hypertension stable on meds  Cont hyzaar.    Generalized weakness  Cont PT  Has a ortho appt this week and hoping to discharge soon  Daughter at bedside updated      History     Patient is a very pleasant 79 year old female who is admitted to TCU patient was admitted  For elective total knee replacement of the right knee on 2/25/2025  Postoperatively discharge weightbearing as tolerated to the TCU  Reporting pain is improving  Has swelling in legs and wearing compression.      Past Medical & Surgical History     PAST MEDICAL HISTORY:   Past Medical History:   Diagnosis Date     Antiplatelet or antithrombotic long-term use      Arthritis      Complication of anesthesia      Difficulty walking      Gastroesophageal reflux disease      Heart disease      Heart murmur      Hypertension      Obese      PONV (postoperative nausea and vomiting)      Sleep disorder 4/26/2010     Thrombosis      Walking troubles       PAST SURGICAL HISTORY:   has a past surgical history that includes Arthroplasty knee (Right,  2/25/2025).      Past Social History     Reviewed,  reports that she has never smoked. She has never used smokeless tobacco. She reports that she does not currently use alcohol. She reports that she does not currently use drugs.    Family History     Reviewed, and family history is not on file.    Medication List     Current Outpatient Medications   Medication Sig Dispense Refill     acetaminophen (TYLENOL) 325 MG tablet Take 3 tablets (975 mg) by mouth every 6 hours. 100 tablet 0     allopurinol (ZYLOPRIM) 100 MG tablet Take 100 mg by mouth 2 times daily.       calcium carbonate-vitamin D (CALTRATE) 600-10 MG-MCG per tablet Take 1 tablet by mouth 2 times daily.       cefadroxil (DURICEF) 500 MG capsule Take 1 capsule (500 mg) by mouth 2 times daily. 28 capsule 0     fluticasone (FLONASE) 50 MCG/ACT nasal spray Spray 2 sprays into both nostrils daily.       gabapentin (NEURONTIN) 100 MG capsule Take 1 capsule (100 mg) by mouth 2 times daily. 30 capsule 0     ketoconazole (NIZORAL) 2 % external cream Apply topically daily as needed for itching.       losartan-hydrochlorothiazide (HYZAAR) 50-12.5 MG tablet Take 1 tablet by mouth daily.       magnesium oxide 400 MG CAPS Take 400 mg by mouth daily.       meclizine (ANTIVERT) 25 MG tablet Take 25 mg by mouth 3 times daily as needed for dizziness.       multivitamin w/minerals (MULTI-VITAMIN) tablet Take 1 tablet by mouth daily.       nystatin (MYCOSTATIN) 392867 UNIT/GM external powder Apply topically 3 times daily as needed.       olopatadine (PATADAY) 0.2 % ophthalmic solution Place 1 drop into both eyes daily as needed.       omeprazole 20 MG tablet Take 20 mg by mouth 2 times daily.       ondansetron (ZOFRAN) 8 MG tablet Take 8 mg by mouth every 8 hours as needed for nausea.       oxyCODONE (ROXICODONE) 5 MG tablet Take 1 tablet (5 mg) by mouth every 4 hours as needed for severe pain. 26 tablet 0     polyethylene glycol (MIRALAX) 17 GM/Dose powder Take 17 g by mouth  daily. 510 g 0     potassium chloride ER (MICRO-K) 10 MEQ CR capsule Take 10 mEq by mouth 3 times daily.       predniSONE (DELTASONE) 1 MG tablet Take 2 mg by mouth daily.       rivaroxaban ANTICOAGULANT (XARELTO) 10 MG TABS tablet Take by mouth daily (with dinner).       senna-docusate (SENOKOT-S/PERICOLACE) 8.6-50 MG tablet Take 1 tablet by mouth 2 times daily. 60 tablet 0     sodium chloride (OCEAN) 0.65 % nasal spray Spray 1 spray into both nostrils every 2 hours as needed for congestion (dryness).       vitamin B-12 (CYANOCOBALAMIN) 250 MCG tablet Take 250 mcg by mouth daily.       Vitamin D3 (CHOLECALCIFEROL) 25 mcg (1000 units) tablet Take 1 tablet by mouth daily.       No current facility-administered medications for this visit.      MED REC REQUIRED  Post Medication Reconciliation Status: discharge medications reconciled, continue medications without change       Allergies     Allergies   Allergen Reactions     Codeine Dizziness and Difficulty breathing     Alendronate Other (See Comments)     Severe reflux     Hydrocodone Nausea     Hydrocodone-Acetaminophen Nausea and Vomiting     Mold      Morphine Nausea     Nsaids Other (See Comments)     Pt on xarelton     Oxycodone-Acetaminophen Dizziness and Nausea     Pollen Extract      Pravastatin Muscle Pain (Myalgia)     Short Ragweed Pollen Ext Other (See Comments)     Sneezing, congestion     Simvastatin Muscle Pain (Myalgia)     Sulfa Antibiotics Hives     Adhesive Tape Rash     Conjugated Estrogens Other (See Comments) and Rash     Labia irritation       Review of Systems   A comprehensive review of 14 systems was done. Pertinent findings noted here and in history of present illness. All the rest negative.  Constitutional: Negative.  Negative for fever, chills, she has  activity change, appetite change and fatigue.   HENT: Negative for congestion and facial swelling.    Eyes: Negative for photophobia, redness and visual disturbance.   Respiratory: Negative  "for cough and chest tightness.    Cardiovascular: Negative for chest pain, palpitations and rt >L leg swelling.   Gastrointestinal: Negative for nausea, diarrhea, constipation, blood in stool and abdominal distention.   Genitourinary: Negative.    Musculoskeletal: reporting pain in knee and improving  Skin: Negative.    Neurological: Negative for dizziness, tremors, syncope, weakness, light-headedness and headaches.   Hematological: Does not bruise/bleed easily.   Psychiatric/Behavioral: Negative.        Physical Exam   BP (!) 153/55   Pulse 85   Temp 96.8  F (36  C)   Resp 18   Ht 1.575 m (5' 2\")   Wt 96.6 kg (213 lb)   SpO2 (!) 91%   BMI 38.96 kg/m       Constitutional: Oriented to person, place, and time and appears well-developed.   HEENT:  Normocephalic and atraumatic.  Eyes: Conjunctivae and EOM are normal. Pupils are equal, round, and reactive to light. No discharge.  No scleral icterus. Nose normal. Mouth/Throat: Oropharynx is clear and moist. No oropharyngeal exudate.    NECK: Normal range of motion. Neck supple. No JVD present. No tracheal deviation present. No thyromegaly present.   CARDIOVASCULAR: Normal rate, regular rhythm and intact distal pulses.  Exam reveals no gallop and no friction rub.  Systolic murmur present.  PULMONARY: Effort normal and breath sounds normal. No respiratory distress.No Wheezing or rales.  ABDOMEN: Soft. Bowel sounds are normal. No distension and no mass.  There is no tenderness. There is no rebound and no guarding. No HSM.  MUSCULOSKELETAL: Normal range of motion. Mild kyphosis, no tenderness.  Rt knee with intact incision ,  LYMPH NODES: Has no cervical, supraclavicular, axillary and groin adenopathy.   NEUROLOGICAL: Alert and oriented to person, place, and time. No cranial nerve deficit.  Normal muscle tone. Coordination normal.   GENITOURINARY: Deferred exam.  SKIN: Skin is warm and dry. No rash noted. No erythema. No pallor.   EXTREMITIES: No cyanosis, no " clubbing,trace to 1+ edema. No Deformity.  PSYCHIATRIC: Normal mood, affect and behavior.      Lab Results     Recent Results (from the past 240 hours)   Potassium - Pre-Op    Collection Time: 02/25/25  6:29 AM   Result Value Ref Range    Potassium 3.5 3.4 - 5.3 mmol/L   Creatinine    Collection Time: 02/25/25  6:29 AM   Result Value Ref Range    Creatinine 0.96 (H) 0.51 - 0.95 mg/dL    GFR Estimate 60 (L) >60 mL/min/1.73m2   Hemoglobin    Collection Time: 02/26/25  6:17 AM   Result Value Ref Range    Hemoglobin 10.2 (L) 11.7 - 15.7 g/dL   Glucose    Collection Time: 02/26/25  6:17 AM   Result Value Ref Range    Glucose 106 (H) 70 - 99 mg/dL    Patient Fasting > 8hrs? Yes    N terminal pro BNP outpatient    Collection Time: 02/26/25  6:17 AM   Result Value Ref Range    N Terminal Pro BNP Outpatient 280 0 - 1,800 pg/mL   Hemoglobin    Collection Time: 02/27/25  6:42 AM   Result Value Ref Range    Hemoglobin 10.8 (L) 11.7 - 15.7 g/dL   Glucose    Collection Time: 02/27/25  6:42 AM   Result Value Ref Range    Glucose 112 (H) 70 - 99 mg/dL    Patient Fasting > 8hrs? Yes    CBC with platelets    Collection Time: 03/04/25  9:01 AM   Result Value Ref Range    WBC Count 8.4 4.0 - 11.0 10e3/uL    RBC Count 3.79 (L) 3.80 - 5.20 10e6/uL    Hemoglobin 10.7 (L) 11.7 - 15.7 g/dL    Hematocrit 34.5 (L) 35.0 - 47.0 %    MCV 91 78 - 100 fL    MCH 28.2 26.5 - 33.0 pg    MCHC 31.0 (L) 31.5 - 36.5 g/dL    RDW 15.3 (H) 10.0 - 15.0 %    Platelet Count 298 150 - 450 10e3/uL   Glucose (OUTREACH)    Collection Time: 03/04/25  9:01 AM   Result Value Ref Range    Glucose 118 (H) 70 - 99 mg/dL   Basic Metabolic Panel No Glucose (OUTREACH)    Collection Time: 03/04/25  9:01 AM   Result Value Ref Range    Sodium 142 135 - 145 mmol/L    Potassium 4.1 3.4 - 5.3 mmol/L    Chloride 104 98 - 107 mmol/L    Carbon Dioxide (CO2) 28 22 - 29 mmol/L    Anion Gap 10 7 - 15 mmol/L    Urea Nitrogen 17.7 8.0 - 23.0 mg/dL    Creatinine 1.02 (H) 0.51 - 0.95  mg/dL    GFR Estimate 55 (L) >60 mL/min/1.73m2    Calcium 10.0 8.8 - 10.4 mg/dL           Electronically signed by    Aarti Judge MD                            Sincerely,        LISANDRO Lucio    Electronically signed

## 2025-03-08 ENCOUNTER — LAB REQUISITION (OUTPATIENT)
Dept: LAB | Facility: CLINIC | Age: 80
End: 2025-03-08
Payer: COMMERCIAL

## 2025-03-08 DIAGNOSIS — N39.0 URINARY TRACT INFECTION, SITE NOT SPECIFIED: ICD-10-CM

## 2025-03-08 LAB
ALBUMIN UR-MCNC: NEGATIVE MG/DL
APPEARANCE UR: CLEAR
BILIRUB UR QL STRIP: NEGATIVE
COLOR UR AUTO: YELLOW
GLUCOSE UR STRIP-MCNC: NEGATIVE MG/DL
HGB UR QL STRIP: NEGATIVE
KETONES UR STRIP-MCNC: NEGATIVE MG/DL
LEUKOCYTE ESTERASE UR QL STRIP: NEGATIVE
MUCOUS THREADS #/AREA URNS LPF: PRESENT /LPF
NITRATE UR QL: NEGATIVE
PH UR STRIP: 6 [PH] (ref 5–7)
RBC URINE: <1 /HPF
SP GR UR STRIP: 1.02 (ref 1–1.03)
SQUAMOUS EPITHELIAL: 3 /HPF
UROBILINOGEN UR STRIP-MCNC: 2 MG/DL
WBC URINE: 4 /HPF

## 2025-03-08 PROCEDURE — 81001 URINALYSIS AUTO W/SCOPE: CPT | Mod: ORL | Performed by: FAMILY MEDICINE

## 2025-03-10 ENCOUNTER — LAB REQUISITION (OUTPATIENT)
Dept: LAB | Facility: CLINIC | Age: 80
End: 2025-03-10
Payer: COMMERCIAL

## 2025-03-10 ENCOUNTER — TRANSITIONAL CARE UNIT VISIT (OUTPATIENT)
Dept: GERIATRICS | Facility: CLINIC | Age: 80
End: 2025-03-10
Payer: COMMERCIAL

## 2025-03-10 VITALS
BODY MASS INDEX: 39.2 KG/M2 | TEMPERATURE: 98.3 F | HEART RATE: 99 BPM | HEIGHT: 62 IN | RESPIRATION RATE: 18 BRPM | WEIGHT: 213 LBS | DIASTOLIC BLOOD PRESSURE: 65 MMHG | OXYGEN SATURATION: 96 % | SYSTOLIC BLOOD PRESSURE: 134 MMHG

## 2025-03-10 DIAGNOSIS — Z96.60 STATUS POST JOINT REPLACEMENT: ICD-10-CM

## 2025-03-10 DIAGNOSIS — Z51.81 ENCOUNTER FOR THERAPEUTIC DRUG LEVEL MONITORING: ICD-10-CM

## 2025-03-10 DIAGNOSIS — R52 PAIN MANAGEMENT: ICD-10-CM

## 2025-03-10 DIAGNOSIS — R53.81 PHYSICAL DECONDITIONING: Primary | ICD-10-CM

## 2025-03-10 DIAGNOSIS — Z47.1 AFTERCARE FOLLOWING JOINT REPLACEMENT SURGERY: ICD-10-CM

## 2025-03-10 PROCEDURE — 99309 SBSQ NF CARE MODERATE MDM 30: CPT | Performed by: NURSE PRACTITIONER

## 2025-03-10 NOTE — LETTER
3/10/2025      Zoey Pereira  [address] [facility] [address]         HEALTH GERIATRIC SERVICES  Chief Complaint   Patient presents with     [facility] [place] Medical Record Number:  [id number]  Place of Service where encounter took place:  [facility] ([facility]) [[id number]]  Code Status:  Full Code     HISTORY:      HPI:  Zoey Pereira  is 79 year old (1945) undergoing physical and occupational therapy.she is with past medical history arthritis, heart murmur, hypertension, GERD excerpted from records  who underwent the right total knee arthroplasty with Dr. Paris on 2/25/25. There were no intraoperative complications and the patient was transferred to the recovery room and later the orthopedic unit in stable condition.       Today she is seen to review vital signs, labs, routine visit and for reports of rash around bilateral ankles and right knee on fire.  She reports she stopped taking her oxycodone due to fear of not being able to get off it however she is having pain.  There was no redness noted around her ankles,  she was with dry skin and Eucerin cream was ordered.   she denied chest pain shortness of breath cough, has congestion and uses Flonase, Recent  right calf venous ultrasound ordered.   And negative for DVT. Of note she is on Xarelto. Last hemoglobin 10.7 on 3/4/25. She was encouraged to use the pain medication if her pain is increased. She will follow up with Orthopedics on Thursday 3/13/25.   Per her report she lives independently with her .      ALLERGIES:Codeine, Alendronate, Hydrocodone, Hydrocodone-acetaminophen, Mold, Morphine, Nsaids, Oxycodone-acetaminophen, Pollen extract, Pravastatin, Short ragweed pollen ext, Simvastatin, Sulfa antibiotics, Adhesive tape, and Conjugated estrogens    PAST MEDICAL HISTORY:   Past Medical History:   Diagnosis Date     Antiplatelet or antithrombotic long-term use      Arthritis      Complication of anesthesia      Difficulty  walking      Gastroesophageal reflux disease      Heart disease      Heart murmur      Hypertension      Obese      PONV (postoperative nausea and vomiting)      Sleep disorder 4/26/2010     Thrombosis      Walking troubles        PAST SURGICAL HISTORY:   has a past surgical history that includes Arthroplasty knee (Right, 2/25/2025).    FAMILY HISTORY: family history is not on file.    SOCIAL HISTORY:  reports that she has never smoked. She has never used smokeless tobacco. She reports that she does not currently use alcohol. She reports that she does not currently use drugs.    ROS:  Constitutional: Negative for activity change, appetite change, fatigue and fever.   HENT: Positive for congestion.  On Flonase  Respiratory: Negative for cough, shortness of breath and wheezing.    Cardiovascular: Negative for chest pain and positive leg swelling. Negative US for DVT  Gastrointestinal: Negative for abdominal distention, abdominal pain, constipation, diarrhea and nausea.   Genitourinary: Negative for dysuria.   Musculoskeletal: Negative for arthralgia. Negative for back pain.   Skin: Negative for color change and wound.  Right knee surgical incision with Aquacel dressing  Neurological: Negative for dizziness.   Psychiatric/Behavioral: Negative for agitation, behavioral problems and confusion.     Physical Exam:  Constitutional:       Appearance: Patient is well-developed.   HENT:      Head: Normocephalic.   Eyes:      Conjunctiva/sclera: Conjunctivae normal.   Neck:      Musculoskeletal: Normal range of motion.   Cardiovascular:      Rate and Rhythm: Normal rate and regular rhythm.      Heart sounds: Normal heart sounds. No murmur.   Pulmonary:      Effort: No respiratory distress.      Breath sounds: Normal breath sounds. No wheezing or rales.   Abdominal:      General: Bowel sounds are normal. There is no distension.      Palpations: Abdomen is soft.      Tenderness: There is no abdominal tenderness.  "  Musculoskeletal:       Normal range of motion.  Right knee surgical incision covered with Aquacel dressing   skin:General:        Skin is warm.   Neurological:         Mental Status: Patient is alert and oriented to person, place, and time.   Psychiatric:         Behavior: Behavior normal.     Vitals:/65   Pulse 99   Temp 98.3  F (36.8  C)   Resp 18   Ht 1.575 m (5' 2\")   Wt 96.6 kg (213 lb)   SpO2 96%   BMI 38.96 kg/m   and Body mass index is 38.96 kg/m .    Lab/Diagnostic data:   Recent Results (from the past 240 hours)   CBC with platelets    Collection Time: 03/04/25  9:01 AM   Result Value Ref Range    WBC Count 8.4 4.0 - 11.0 10e3/uL    RBC Count 3.79 (L) 3.80 - 5.20 10e6/uL    Hemoglobin 10.7 (L) 11.7 - 15.7 g/dL    Hematocrit 34.5 (L) 35.0 - 47.0 %    MCV 91 78 - 100 fL    MCH 28.2 26.5 - 33.0 pg    MCHC 31.0 (L) 31.5 - 36.5 g/dL    RDW 15.3 (H) 10.0 - 15.0 %    Platelet Count 298 150 - 450 10e3/uL   Glucose (OUTREACH)    Collection Time: 03/04/25  9:01 AM   Result Value Ref Range    Glucose 118 (H) 70 - 99 mg/dL   Basic Metabolic Panel No Glucose (OUTREACH)    Collection Time: 03/04/25  9:01 AM   Result Value Ref Range    Sodium 142 135 - 145 mmol/L    Potassium 4.1 3.4 - 5.3 mmol/L    Chloride 104 98 - 107 mmol/L    Carbon Dioxide (CO2) 28 22 - 29 mmol/L    Anion Gap 10 7 - 15 mmol/L    Urea Nitrogen 17.7 8.0 - 23.0 mg/dL    Creatinine 1.02 (H) 0.51 - 0.95 mg/dL    GFR Estimate 55 (L) >60 mL/min/1.73m2    Calcium 10.0 8.8 - 10.4 mg/dL   UA with Microscopic reflex to Culture    Collection Time: 03/08/25  4:00 PM    Specimen: Urine, Clean Catch   Result Value Ref Range    Color Urine Yellow Colorless, Straw, Light Yellow, Yellow    Appearance Urine Clear Clear    Glucose Urine Negative Negative mg/dL    Bilirubin Urine Negative Negative    Ketones Urine Negative Negative mg/dL    Specific Gravity Urine 1.025 1.003 - 1.035    Blood Urine Negative Negative    pH Urine 6.0 5.0 - 7.0    Protein " Albumin Urine Negative Negative mg/dL    Urobilinogen Urine 2.0 Normal, 2.0 mg/dL    Nitrite Urine Negative Negative    Leukocyte Esterase Urine Negative Negative    Mucus Urine Present (A) None Seen /LPF    RBC Urine <1 <=2 /HPF    WBC Urine 4 <=5 /HPF    Squamous Epithelials Urine 3 (H) <=1 /HPF   Glucose (OUTREACH)    Collection Time: 03/11/25  6:19 AM   Result Value Ref Range    Glucose 92 70 - 99 mg/dL        MEDICATIONS:  MED REC REQUIRED  Post Medication Reconciliation Status: discharge medications reconciled, continue medications without change          Review of your medicines            Accurate as of March 10, 2025 11:59 PM. If you have any questions, ask your nurse or doctor.                CONTINUE these medicines which have NOT CHANGED        Dose / Directions   acetaminophen 325 MG tablet  Commonly known as: TYLENOL  Used for: Status post joint replacement      Dose: 975 mg  Take 3 tablets (975 mg) by mouth every 6 hours.  Quantity: 100 tablet  Refills: 0     allopurinol 100 MG tablet  Commonly known as: ZYLOPRIM      Dose: 100 mg  Take 100 mg by mouth 2 times daily.  Refills: 0     calcium carbonate-vitamin D 600-10 MG-MCG per tablet  Commonly known as: CALTRATE      Dose: 1 tablet  Take 1 tablet by mouth 2 times daily.  Refills: 0     cefadroxil 500 MG capsule  Commonly known as: DURICEF  Used for: Status post joint replacement      Dose: 500 mg  Take 1 capsule (500 mg) by mouth 2 times daily.  Quantity: 28 capsule  Refills: 0     fluticasone 50 MCG/ACT nasal spray  Commonly known as: FLONASE      Dose: 2 spray  Spray 2 sprays into both nostrils daily.  Refills: 0     gabapentin 100 MG capsule  Commonly known as: NEURONTIN  Used for: Status post joint replacement      Dose: 100 mg  Take 1 capsule (100 mg) by mouth 2 times daily.  Quantity: 30 capsule  Refills: 0     ketoconazole 2 % external cream  Commonly known as: NIZORAL      Apply topically daily as needed for itching.  Refills: 0      losartan-hydrochlorothiazide 50-12.5 MG tablet  Commonly known as: HYZAAR      Dose: 1 tablet  Take 1 tablet by mouth daily.  Refills: 0     magnesium oxide 400 MG Caps      Dose: 400 mg  Take 400 mg by mouth daily.  Refills: 0     meclizine 25 MG tablet  Commonly known as: ANTIVERT      Dose: 25 mg  Take 25 mg by mouth 3 times daily as needed for dizziness.  Refills: 0     Multi-vitamin per tablet  Generic drug: multivitamin w/minerals      Dose: 1 tablet  Take 1 tablet by mouth daily.  Refills: 0     nystatin 449693 UNIT/GM external powder  Commonly known as: MYCOSTATIN      Apply topically 3 times daily as needed.  Refills: 0     olopatadine 0.2 % ophthalmic solution  Commonly known as: PATADAY      Dose: 1 drop  Place 1 drop into both eyes daily as needed.  Refills: 0     omeprazole 20 MG tablet      Dose: 20 mg  Take 20 mg by mouth 2 times daily.  Refills: 0     ondansetron 8 MG tablet  Commonly known as: ZOFRAN      Dose: 8 mg  Take 8 mg by mouth every 8 hours as needed for nausea.  Refills: 0     oxyCODONE 5 MG tablet  Commonly known as: ROXICODONE  Used for: Status post joint replacement      Dose: 5 mg  Take 1 tablet (5 mg) by mouth every 4 hours as needed for severe pain.  Quantity: 26 tablet  Refills: 0     polyethylene glycol 17 GM/Dose powder  Commonly known as: MIRALAX  Used for: Status post joint replacement      Dose: 17 g  Take 17 g by mouth daily.  Quantity: 510 g  Refills: 0     potassium chloride ER 10 MEQ CR capsule  Commonly known as: MICRO-K      Dose: 10 mEq  Take 10 mEq by mouth 3 times daily.  Refills: 0     predniSONE 1 MG tablet  Commonly known as: DELTASONE      Dose: 2 mg  Take 2 mg by mouth daily.  Refills: 0     rivaroxaban ANTICOAGULANT 10 MG Tabs tablet  Commonly known as: XARELTO      Take by mouth daily (with dinner).  Refills: 0     senna-docusate 8.6-50 MG tablet  Commonly known as: SENOKOT-S/PERICOLACE  Used for: Status post joint replacement      Dose: 1 tablet  Take 1  tablet by mouth 2 times daily.  Quantity: 60 tablet  Refills: 0     sodium chloride 0.65 % nasal spray  Commonly known as: OCEAN      Dose: 1 spray  Spray 1 spray into both nostrils every 2 hours as needed for congestion (dryness).  Refills: 0     vitamin B-12 250 MCG tablet  Commonly known as: CYANOCOBALAMIN      Dose: 250 mcg  Take 250 mcg by mouth daily.  Refills: 0     Vitamin D3 25 mcg (1000 units) tablet  Commonly known as: CHOLECALCIFEROL      Dose: 1 tablet  Take 1 tablet by mouth daily.  Refills: 0              ASSESSMENT/PLAN  Encounter Diagnoses   Name Primary?     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    Anticoagulation management continue Xarelto    Gout on allopurinol    Infection on cefadroxil until 3/14/2025    Neuropathy continue gabapenti    hypertension on losartan    Dizziness as needed meclizine    GERD continue omeprazole    Nausea as needed Zofran    Right total knee arthroplasty follow-up with orthopedics, pain management    Physical deconditioning PT OT    Right calf pain venous ultrasound ordered and negative for DVT    Lower extremity edema compression on during the day off at night    Electronically signed by: Rosy Zamarripa CNP       Sincerely,        Rosy Zamarripa CNP    Electronically signed

## 2025-03-11 LAB — GLUCOSE SERPL-MCNC: 92 MG/DL (ref 70–99)

## 2025-03-11 PROCEDURE — 36415 COLL VENOUS BLD VENIPUNCTURE: CPT | Mod: ORL | Performed by: NURSE PRACTITIONER

## 2025-03-11 PROCEDURE — 80048 BASIC METABOLIC PNL TOTAL CA: CPT | Mod: ORL | Performed by: NURSE PRACTITIONER

## 2025-03-11 PROCEDURE — P9604 ONE-WAY ALLOW PRORATED TRIP: HCPCS | Mod: ORL | Performed by: NURSE PRACTITIONER

## 2025-03-11 RX ORDER — LANOLIN ALCOHOL/MO/W.PET/CERES
CREAM (GRAM) TOPICAL DAILY
COMMUNITY

## 2025-03-11 NOTE — PROGRESS NOTES
[facility] GERIATRIC SERVICES  Chief Complaint   Patient presents with    BRANDIWILLY     [place] Medical Record Number:  [id number]  Place of Service where encounter took place:  [facility] ([facility]) [[id number]]  Code Status:  Full Code     HISTORY:      HPI:  Zoey Pereira  is 79 year old (1945) undergoing physical and occupational therapy.she is with past medical history arthritis, heart murmur, hypertension, GERD excerpted from records  who underwent the right total knee arthroplasty with Dr. Paris on 2/25/25. There were no intraoperative complications and the patient was transferred to the recovery room and later the orthopedic unit in stable condition.       Today she is seen to review vital signs, labs, routine visit and for reports of rash around bilateral ankles and right knee on fire.  She reports she stopped taking her oxycodone due to fear of not being able to get off it however she is having pain.  There was no redness noted around her ankles,  she was with dry skin and Eucerin cream was ordered.   she denied chest pain shortness of breath cough, has congestion and uses Flonase, Recent  right calf venous ultrasound ordered.   And negative for DVT. Of note she is on Xarelto. Last hemoglobin 10.7 on 3/4/25. She was encouraged to use the pain medication if her pain is increased. She will follow up with Orthopedics on Thursday 3/13/25.   Per her report she lives independently with her .      ALLERGIES:Codeine, Alendronate, Hydrocodone, Hydrocodone-acetaminophen, Mold, Morphine, Nsaids, Oxycodone-acetaminophen, Pollen extract, Pravastatin, Short ragweed pollen ext, Simvastatin, Sulfa antibiotics, Adhesive tape, and Conjugated estrogens    PAST MEDICAL HISTORY:   Past Medical History:   Diagnosis Date    Antiplatelet or antithrombotic long-term use     Arthritis     Complication of anesthesia     Difficulty walking     Gastroesophageal reflux disease     Heart disease     Heart murmur      Hypertension     Obese     PONV (postoperative nausea and vomiting)     Sleep disorder 4/26/2010    Thrombosis     Walking troubles        PAST SURGICAL HISTORY:   has a past surgical history that includes Arthroplasty knee (Right, 2/25/2025).    FAMILY HISTORY: family history is not on file.    SOCIAL HISTORY:  reports that she has never smoked. She has never used smokeless tobacco. She reports that she does not currently use alcohol. She reports that she does not currently use drugs.    ROS:  Constitutional: Negative for activity change, appetite change, fatigue and fever.   HENT: Positive for congestion.  On Flonase  Respiratory: Negative for cough, shortness of breath and wheezing.    Cardiovascular: Negative for chest pain and positive leg swelling. Negative US for DVT  Gastrointestinal: Negative for abdominal distention, abdominal pain, constipation, diarrhea and nausea.   Genitourinary: Negative for dysuria.   Musculoskeletal: Negative for arthralgia. Negative for back pain.   Skin: Negative for color change and wound.  Right knee surgical incision with Aquacel dressing  Neurological: Negative for dizziness.   Psychiatric/Behavioral: Negative for agitation, behavioral problems and confusion.     Physical Exam:  Constitutional:       Appearance: Patient is well-developed.   HENT:      Head: Normocephalic.   Eyes:      Conjunctiva/sclera: Conjunctivae normal.   Neck:      Musculoskeletal: Normal range of motion.   Cardiovascular:      Rate and Rhythm: Normal rate and regular rhythm.      Heart sounds: Normal heart sounds. No murmur.   Pulmonary:      Effort: No respiratory distress.      Breath sounds: Normal breath sounds. No wheezing or rales.   Abdominal:      General: Bowel sounds are normal. There is no distension.      Palpations: Abdomen is soft.      Tenderness: There is no abdominal tenderness.   Musculoskeletal:       Normal range of motion.  Right knee surgical incision covered with Aquacel dressing  "  skin:General:        Skin is warm.   Neurological:         Mental Status: Patient is alert and oriented to person, place, and time.   Psychiatric:         Behavior: Behavior normal.     Vitals:/65   Pulse 99   Temp 98.3  F (36.8  C)   Resp 18   Ht 1.575 m (5' 2\")   Wt 96.6 kg (213 lb)   SpO2 96%   BMI 38.96 kg/m   and Body mass index is 38.96 kg/m .    Lab/Diagnostic data:   Recent Results (from the past 240 hours)   CBC with platelets    Collection Time: 03/04/25  9:01 AM   Result Value Ref Range    WBC Count 8.4 4.0 - 11.0 10e3/uL    RBC Count 3.79 (L) 3.80 - 5.20 10e6/uL    Hemoglobin 10.7 (L) 11.7 - 15.7 g/dL    Hematocrit 34.5 (L) 35.0 - 47.0 %    MCV 91 78 - 100 fL    MCH 28.2 26.5 - 33.0 pg    MCHC 31.0 (L) 31.5 - 36.5 g/dL    RDW 15.3 (H) 10.0 - 15.0 %    Platelet Count 298 150 - 450 10e3/uL   Glucose (OUTREACH)    Collection Time: 03/04/25  9:01 AM   Result Value Ref Range    Glucose 118 (H) 70 - 99 mg/dL   Basic Metabolic Panel No Glucose (OUTREACH)    Collection Time: 03/04/25  9:01 AM   Result Value Ref Range    Sodium 142 135 - 145 mmol/L    Potassium 4.1 3.4 - 5.3 mmol/L    Chloride 104 98 - 107 mmol/L    Carbon Dioxide (CO2) 28 22 - 29 mmol/L    Anion Gap 10 7 - 15 mmol/L    Urea Nitrogen 17.7 8.0 - 23.0 mg/dL    Creatinine 1.02 (H) 0.51 - 0.95 mg/dL    GFR Estimate 55 (L) >60 mL/min/1.73m2    Calcium 10.0 8.8 - 10.4 mg/dL   UA with Microscopic reflex to Culture    Collection Time: 03/08/25  4:00 PM    Specimen: Urine, Clean Catch   Result Value Ref Range    Color Urine Yellow Colorless, Straw, Light Yellow, Yellow    Appearance Urine Clear Clear    Glucose Urine Negative Negative mg/dL    Bilirubin Urine Negative Negative    Ketones Urine Negative Negative mg/dL    Specific Gravity Urine 1.025 1.003 - 1.035    Blood Urine Negative Negative    pH Urine 6.0 5.0 - 7.0    Protein Albumin Urine Negative Negative mg/dL    Urobilinogen Urine 2.0 Normal, 2.0 mg/dL    Nitrite Urine Negative " Negative    Leukocyte Esterase Urine Negative Negative    Mucus Urine Present (A) None Seen /LPF    RBC Urine <1 <=2 /HPF    WBC Urine 4 <=5 /HPF    Squamous Epithelials Urine 3 (H) <=1 /HPF   Glucose (OUTREACH)    Collection Time: 03/11/25  6:19 AM   Result Value Ref Range    Glucose 92 70 - 99 mg/dL        MEDICATIONS:  MED REC REQUIRED  Post Medication Reconciliation Status: discharge medications reconciled, continue medications without change          Review of your medicines            Accurate as of March 10, 2025 11:59 PM. If you have any questions, ask your nurse or doctor.                CONTINUE these medicines which have NOT CHANGED        Dose / Directions   acetaminophen 325 MG tablet  Commonly known as: TYLENOL  Used for: Status post joint replacement      Dose: 975 mg  Take 3 tablets (975 mg) by mouth every 6 hours.  Quantity: 100 tablet  Refills: 0     allopurinol 100 MG tablet  Commonly known as: ZYLOPRIM      Dose: 100 mg  Take 100 mg by mouth 2 times daily.  Refills: 0     calcium carbonate-vitamin D 600-10 MG-MCG per tablet  Commonly known as: CALTRATE      Dose: 1 tablet  Take 1 tablet by mouth 2 times daily.  Refills: 0     cefadroxil 500 MG capsule  Commonly known as: DURICEF  Used for: Status post joint replacement      Dose: 500 mg  Take 1 capsule (500 mg) by mouth 2 times daily.  Quantity: 28 capsule  Refills: 0     fluticasone 50 MCG/ACT nasal spray  Commonly known as: FLONASE      Dose: 2 spray  Spray 2 sprays into both nostrils daily.  Refills: 0     gabapentin 100 MG capsule  Commonly known as: NEURONTIN  Used for: Status post joint replacement      Dose: 100 mg  Take 1 capsule (100 mg) by mouth 2 times daily.  Quantity: 30 capsule  Refills: 0     ketoconazole 2 % external cream  Commonly known as: NIZORAL      Apply topically daily as needed for itching.  Refills: 0     losartan-hydrochlorothiazide 50-12.5 MG tablet  Commonly known as: HYZAAR      Dose: 1 tablet  Take 1 tablet by mouth  daily.  Refills: 0     magnesium oxide 400 MG Caps      Dose: 400 mg  Take 400 mg by mouth daily.  Refills: 0     meclizine 25 MG tablet  Commonly known as: ANTIVERT      Dose: 25 mg  Take 25 mg by mouth 3 times daily as needed for dizziness.  Refills: 0     Multi-vitamin per tablet  Generic drug: multivitamin w/minerals      Dose: 1 tablet  Take 1 tablet by mouth daily.  Refills: 0     nystatin 152075 UNIT/GM external powder  Commonly known as: MYCOSTATIN      Apply topically 3 times daily as needed.  Refills: 0     olopatadine 0.2 % ophthalmic solution  Commonly known as: PATADAY      Dose: 1 drop  Place 1 drop into both eyes daily as needed.  Refills: 0     omeprazole 20 MG tablet      Dose: 20 mg  Take 20 mg by mouth 2 times daily.  Refills: 0     ondansetron 8 MG tablet  Commonly known as: ZOFRAN      Dose: 8 mg  Take 8 mg by mouth every 8 hours as needed for nausea.  Refills: 0     oxyCODONE 5 MG tablet  Commonly known as: ROXICODONE  Used for: Status post joint replacement      Dose: 5 mg  Take 1 tablet (5 mg) by mouth every 4 hours as needed for severe pain.  Quantity: 26 tablet  Refills: 0     polyethylene glycol 17 GM/Dose powder  Commonly known as: MIRALAX  Used for: Status post joint replacement      Dose: 17 g  Take 17 g by mouth daily.  Quantity: 510 g  Refills: 0     potassium chloride ER 10 MEQ CR capsule  Commonly known as: MICRO-K      Dose: 10 mEq  Take 10 mEq by mouth 3 times daily.  Refills: 0     predniSONE 1 MG tablet  Commonly known as: DELTASONE      Dose: 2 mg  Take 2 mg by mouth daily.  Refills: 0     rivaroxaban ANTICOAGULANT 10 MG Tabs tablet  Commonly known as: XARELTO      Take by mouth daily (with dinner).  Refills: 0     senna-docusate 8.6-50 MG tablet  Commonly known as: SENOKOT-S/PERICOLACE  Used for: Status post joint replacement      Dose: 1 tablet  Take 1 tablet by mouth 2 times daily.  Quantity: 60 tablet  Refills: 0     sodium chloride 0.65 % nasal spray  Commonly known as:  OCEAN      Dose: 1 spray  Spray 1 spray into both nostrils every 2 hours as needed for congestion (dryness).  Refills: 0     vitamin B-12 250 MCG tablet  Commonly known as: CYANOCOBALAMIN      Dose: 250 mcg  Take 250 mcg by mouth daily.  Refills: 0     Vitamin D3 25 mcg (1000 units) tablet  Commonly known as: CHOLECALCIFEROL      Dose: 1 tablet  Take 1 tablet by mouth daily.  Refills: 0              ASSESSMENT/PLAN  Encounter Diagnoses   Name Primary?    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

## 2025-03-12 ENCOUNTER — DISCHARGE SUMMARY NURSING HOME (OUTPATIENT)
Dept: GERIATRICS | Facility: CLINIC | Age: 80
End: 2025-03-12
Payer: COMMERCIAL

## 2025-03-12 VITALS
BODY MASS INDEX: 39.2 KG/M2 | RESPIRATION RATE: 18 BRPM | HEART RATE: 81 BPM | OXYGEN SATURATION: 93 % | DIASTOLIC BLOOD PRESSURE: 47 MMHG | WEIGHT: 213 LBS | SYSTOLIC BLOOD PRESSURE: 111 MMHG | TEMPERATURE: 96.8 F | HEIGHT: 62 IN

## 2025-03-12 DIAGNOSIS — Z96.60 STATUS POST JOINT REPLACEMENT: ICD-10-CM

## 2025-03-12 DIAGNOSIS — R53.81 PHYSICAL DECONDITIONING: Primary | ICD-10-CM

## 2025-03-12 DIAGNOSIS — R52 PAIN MANAGEMENT: ICD-10-CM

## 2025-03-12 PROCEDURE — 99316 NF DSCHRG MGMT 30 MIN+: CPT | Performed by: NURSE PRACTITIONER

## 2025-03-12 NOTE — LETTER
3/12/2025      Zoey Pereira  [address] [facility] [address]         HEALTH GERIATRIC SERVICES  Chief Complaint   Patient presents with     Discharge Summary Nursing [facility] Medical Record Number:  [id number]  Place of Service where encounter took place:  [facility] (SNF) [[id number]]  Code Status:  Full Code     HISTORY:      HPI:  Zoey Pereira  is 79 year old (1945) undergoing physical and occupational therapy.she is with past medical history arthritis, heart murmur, hypertension, GERD excerpted from records  who underwent the right total knee arthroplasty with Dr. Paris on 2/25/25. There were no intraoperative complications and the patient was transferred to the recovery room and later the orthopedic unit in stable condition.       Today she is seen to review vital signs, labs, and a face-to-face for discharge.  She will discharge to home on 3/14/2025 with current medications and treatments.  She will do outpatient physical therapy at [facility].   She denied chest pain shortness of breath cough, has congestion and uses Flonase, Recent  right calf venous ultrasound ordered.   And negative for DVT. Of note she is on Xarelto. Last hemoglobin 10.7 on 3/4/25. She will follow up with Orthopedics on Thursday 3/13/25.   Per her report she lives independently with her .  She has agreed to take the oxycodone and reports her pain is much better.      ALLERGIES:Codeine, Alendronate, Hydrocodone, Hydrocodone-acetaminophen, Mold, Morphine, Nsaids, Oxycodone-acetaminophen, Pollen extract, Pravastatin, Short ragweed pollen ext, Simvastatin, Sulfa antibiotics, Adhesive tape, and Conjugated estrogens    PAST MEDICAL HISTORY:   Past Medical History:   Diagnosis Date     Antiplatelet or antithrombotic long-term use      Arthritis      Complication of anesthesia      Difficulty walking      Gastroesophageal reflux disease      Heart disease      Heart murmur      Hypertension       Obese      PONV (postoperative nausea and vomiting)      Sleep disorder 4/26/2010     Thrombosis      Walking troubles        PAST SURGICAL HISTORY:   has a past surgical history that includes Arthroplasty knee (Right, 2/25/2025).    FAMILY HISTORY: family history is not on file.    SOCIAL HISTORY:  reports that she has never smoked. She has never used smokeless tobacco. She reports that she does not currently use alcohol. She reports that she does not currently use drugs.    ROS:  Constitutional: Negative for activity change, appetite change, fatigue and fever.   HENT: Positive for congestion.  On Flonase  Respiratory: Negative for cough, shortness of breath and wheezing.    Cardiovascular: Negative for chest pain and positive leg swelling. Negative US for DVT  Gastrointestinal: Negative for abdominal distention, abdominal pain, constipation, diarrhea and nausea.   Genitourinary: Negative for dysuria.   Musculoskeletal: Negative for arthralgia. Negative for back pain.   Skin: Negative for color change and wound.  Right knee surgical incision with Aquacel dressing  Neurological: Negative for dizziness.   Psychiatric/Behavioral: Negative for agitation, behavioral problems and confusion.     Physical Exam:  Constitutional:       Appearance: Patient is well-developed.   HENT:      Head: Normocephalic.   Eyes:      Conjunctiva/sclera: Conjunctivae normal.   Neck:      Musculoskeletal: Normal range of motion.   Cardiovascular:      Rate and Rhythm: Normal rate and regular rhythm.      Heart sounds: Normal heart sounds. No murmur.   Pulmonary:      Effort: No respiratory distress.      Breath sounds: Normal breath sounds. No wheezing or rales.   Abdominal:      General: Bowel sounds are normal. There is no distension.      Palpations: Abdomen is soft.      Tenderness: There is no abdominal tenderness.   Musculoskeletal:       Normal range of motion.  Right knee surgical incision covered with Aquacel dressing  "  skin:General:        Skin is warm.   Neurological:         Mental Status: Patient is alert and oriented to person, place, and time.   Psychiatric:         Behavior: Behavior normal.     Vitals:/47   Pulse 81   Temp 96.8  F (36  C)   Resp 18   Ht 1.575 m (5' 2\")   Wt 96.6 kg (213 lb)   SpO2 93%   BMI 38.96 kg/m   and Body mass index is 38.96 kg/m .    Lab/Diagnostic data:   Recent Results (from the past 240 hours)   CBC with platelets    Collection Time: 03/04/25  9:01 AM   Result Value Ref Range    WBC Count 8.4 4.0 - 11.0 10e3/uL    RBC Count 3.79 (L) 3.80 - 5.20 10e6/uL    Hemoglobin 10.7 (L) 11.7 - 15.7 g/dL    Hematocrit 34.5 (L) 35.0 - 47.0 %    MCV 91 78 - 100 fL    MCH 28.2 26.5 - 33.0 pg    MCHC 31.0 (L) 31.5 - 36.5 g/dL    RDW 15.3 (H) 10.0 - 15.0 %    Platelet Count 298 150 - 450 10e3/uL   Glucose (OUTREACH)    Collection Time: 03/04/25  9:01 AM   Result Value Ref Range    Glucose 118 (H) 70 - 99 mg/dL   Basic Metabolic Panel No Glucose (OUTREACH)    Collection Time: 03/04/25  9:01 AM   Result Value Ref Range    Sodium 142 135 - 145 mmol/L    Potassium 4.1 3.4 - 5.3 mmol/L    Chloride 104 98 - 107 mmol/L    Carbon Dioxide (CO2) 28 22 - 29 mmol/L    Anion Gap 10 7 - 15 mmol/L    Urea Nitrogen 17.7 8.0 - 23.0 mg/dL    Creatinine 1.02 (H) 0.51 - 0.95 mg/dL    GFR Estimate 55 (L) >60 mL/min/1.73m2    Calcium 10.0 8.8 - 10.4 mg/dL   UA with Microscopic reflex to Culture    Collection Time: 03/08/25  4:00 PM    Specimen: Urine, Clean Catch   Result Value Ref Range    Color Urine Yellow Colorless, Straw, Light Yellow, Yellow    Appearance Urine Clear Clear    Glucose Urine Negative Negative mg/dL    Bilirubin Urine Negative Negative    Ketones Urine Negative Negative mg/dL    Specific Gravity Urine 1.025 1.003 - 1.035    Blood Urine Negative Negative    pH Urine 6.0 5.0 - 7.0    Protein Albumin Urine Negative Negative mg/dL    Urobilinogen Urine 2.0 Normal, 2.0 mg/dL    Nitrite Urine Negative " Negative    Leukocyte Esterase Urine Negative Negative    Mucus Urine Present (A) None Seen /LPF    RBC Urine <1 <=2 /HPF    WBC Urine 4 <=5 /HPF    Squamous Epithelials Urine 3 (H) <=1 /HPF   Glucose (OUTREACH)    Collection Time: 03/11/25  6:19 AM   Result Value Ref Range    Glucose 92 70 - 99 mg/dL   Basic Metabolic Panel No Glucose (OUTREACH)    Collection Time: 03/11/25  6:19 AM   Result Value Ref Range    Sodium 141 135 - 145 mmol/L    Potassium 3.9 3.4 - 5.3 mmol/L    Chloride 103 98 - 107 mmol/L    Carbon Dioxide (CO2) 26 22 - 29 mmol/L    Anion Gap 12 7 - 15 mmol/L    Urea Nitrogen 16.8 8.0 - 23.0 mg/dL    Creatinine 0.98 (H) 0.51 - 0.95 mg/dL    GFR Estimate 58 (L) >60 mL/min/1.73m2    Calcium          MEDICATIONS:  MED REC REQUIRED  Post Medication Reconciliation Status: discharge medications reconciled, continue medications without change          Review of your medicines            Accurate as of March 12, 2025  7:23 PM. If you have any questions, ask your nurse or doctor.                CONTINUE these medicines which have NOT CHANGED        Dose / Directions   acetaminophen 325 MG tablet  Commonly known as: TYLENOL  Used for: Status post joint replacement      Dose: 975 mg  Take 3 tablets (975 mg) by mouth every 6 hours.  Quantity: 100 tablet  Refills: 0     allopurinol 100 MG tablet  Commonly known as: ZYLOPRIM      Dose: 100 mg  Take 100 mg by mouth 2 times daily.  Refills: 0     calcium carbonate-vitamin D 600-10 MG-MCG per tablet  Commonly known as: CALTRATE      Dose: 1 tablet  Take 1 tablet by mouth 2 times daily.  Refills: 0     cefadroxil 500 MG capsule  Commonly known as: DURICEF  Used for: Status post joint replacement      Dose: 500 mg  Take 1 capsule (500 mg) by mouth 2 times daily.  Quantity: 28 capsule  Refills: 0     fluticasone 50 MCG/ACT nasal spray  Commonly known as: FLONASE      Dose: 2 spray  Spray 2 sprays into both nostrils daily.  Refills: 0     gabapentin 100 MG  capsule  Commonly known as: NEURONTIN  Used for: Status post joint replacement      Dose: 100 mg  Take 1 capsule (100 mg) by mouth 2 times daily.  Quantity: 30 capsule  Refills: 0     ketoconazole 2 % external cream  Commonly known as: NIZORAL      Apply topically daily as needed for itching.  Refills: 0     losartan-hydrochlorothiazide 50-12.5 MG tablet  Commonly known as: HYZAAR      Dose: 1 tablet  Take 1 tablet by mouth daily.  Refills: 0     magnesium oxide 400 MG Caps      Dose: 400 mg  Take 400 mg by mouth daily.  Refills: 0     meclizine 25 MG tablet  Commonly known as: ANTIVERT      Dose: 25 mg  Take 25 mg by mouth 3 times daily as needed for dizziness.  Refills: 0     mineral oil-white petrolatum cream      Apply topically daily. And PRN  Refills: 0     Multi-vitamin per tablet  Generic drug: multivitamin w/minerals      Dose: 1 tablet  Take 1 tablet by mouth daily.  Refills: 0     nystatin 960634 UNIT/GM external powder  Commonly known as: MYCOSTATIN      Apply topically 3 times daily as needed.  Refills: 0     olopatadine 0.2 % ophthalmic solution  Commonly known as: PATADAY      Dose: 1 drop  Place 1 drop into both eyes daily as needed.  Refills: 0     omeprazole 20 MG tablet      Dose: 20 mg  Take 20 mg by mouth 2 times daily.  Refills: 0     ondansetron 8 MG tablet  Commonly known as: ZOFRAN      Dose: 8 mg  Take 8 mg by mouth every 8 hours as needed for nausea.  Refills: 0     oxyCODONE 5 MG tablet  Commonly known as: ROXICODONE  Used for: Status post joint replacement      Dose: 5 mg  Take 1 tablet (5 mg) by mouth every 4 hours as needed for severe pain.  Quantity: 26 tablet  Refills: 0     polyethylene glycol 17 GM/Dose powder  Commonly known as: MIRALAX  Used for: Status post joint replacement      Dose: 17 g  Take 17 g by mouth daily.  Quantity: 510 g  Refills: 0     potassium chloride ER 10 MEQ CR capsule  Commonly known as: MICRO-K      Dose: 10 mEq  Take 10 mEq by mouth 3 times  daily.  Refills: 0     predniSONE 1 MG tablet  Commonly known as: DELTASONE      Dose: 2 mg  Take 2 mg by mouth daily.  Refills: 0     rivaroxaban ANTICOAGULANT 10 MG Tabs tablet  Commonly known as: XARELTO      Take by mouth daily (with dinner).  Refills: 0     senna-docusate 8.6-50 MG tablet  Commonly known as: SENOKOT-S/PERICOLACE  Used for: Status post joint replacement      Dose: 1 tablet  Take 1 tablet by mouth 2 times daily.  Quantity: 60 tablet  Refills: 0     sodium chloride 0.65 % nasal spray  Commonly known as: OCEAN      Dose: 1 spray  Spray 1 spray into both nostrils every 2 hours as needed for congestion (dryness).  Refills: 0     vitamin B-12 250 MCG tablet  Commonly known as: CYANOCOBALAMIN      Dose: 250 mcg  Take 250 mcg by mouth daily.  Refills: 0     Vitamin D3 25 mcg (1000 units) tablet  Commonly known as: CHOLECALCIFEROL      Dose: 1 tablet  Take 1 tablet by mouth daily.  Refills: 0              ASSESSMENT/PLAN  Encounter Diagnoses   Name Primary?     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    Anticoagulation management continue Xarelto    Gout on allopurinol    Infection on cefadroxil until 3/14/2025    Neuropathy continue gabapenti    hypertension on losartan    Dizziness as needed meclizine    GERD continue omeprazole    Nausea as needed Zofran    Right total knee arthroplasty follow-up with orthopedics, pain management    Physical deconditioning she will do outpatient physical therapy at [facility]    Right calf pain venous ultrasound ordered and negative for DVT    Lower extremity edema compression on during the day off at night    DISCHARGE PLAN/FACE TO FACE:  I certify that services are/were furnished while this patient was under the care of a physician and that a physician or an allowed  non-physician practitioner (NPP), had a face-to-face encounter that meets the physician face-to-face encounter requirements. The encounter was in whole, or in part, related to the primary reason for home health. The patient is confined to his/her home and needs intermittent skilled nursing, physical therapy, speech-language pathology, or the continued need for occupational therapy. A plan of care has been established by a physician and is periodically reviewed by a physician.  Date of Face-to-Face Encounter: 3/12/25    I certify that, based on my findings, the following services are medically necessary home health services: outpatient  physical therapy     My clinical findings support the need for the above skilled services because: Physical therapy for continued strength and endurance following right  total knee arthroplasty    This patient is homebound because: Not applicable    The patient is, or has been, under my care and I have initiated the establishment of the plan of care. This patient will be followed by a physician who will periodically review the plan of care.    Schedule follow up visit with primary care provider within 7 days to reestablish care.    Electronically signed by: Rosy Zamarripa CNP         Sincerely,        Rosy Zamarripa CNP    Electronically signed

## 2025-03-13 LAB
ANION GAP SERPL CALCULATED.3IONS-SCNC: 12 MMOL/L (ref 7–15)
BUN SERPL-MCNC: 16.8 MG/DL (ref 8–23)
CALCIUM SERPL-MCNC: 10.4 MG/DL (ref 8.8–10.4)
CHLORIDE SERPL-SCNC: 103 MMOL/L (ref 98–107)
CREAT SERPL-MCNC: 0.98 MG/DL (ref 0.51–0.95)
EGFRCR SERPLBLD CKD-EPI 2021: 58 ML/MIN/1.73M2
HCO3 SERPL-SCNC: 26 MMOL/L (ref 22–29)
POTASSIUM SERPL-SCNC: 3.9 MMOL/L (ref 3.4–5.3)
SODIUM SERPL-SCNC: 141 MMOL/L (ref 135–145)

## 2025-03-13 NOTE — PROGRESS NOTES
[facility] GERIATRIC SERVICES  Chief Complaint   Patient presents with    Discharge Summary [facility] Medical Record Number:  [id number]  Place of Service where encounter took place:  [facility] ([facility]) [[id number]]  Code Status:  Full Code     HISTORY:      HPI:  Zoey Pereira  is 79 year old (1945) undergoing physical and occupational therapy.she is with past medical history arthritis, heart murmur, hypertension, GERD excerpted from records  who underwent the right total knee arthroplasty with Dr. Paris on 2/25/25. There were no intraoperative complications and the patient was transferred to the recovery room and later the orthopedic unit in stable condition.       Today she is seen to review vital signs, labs, and a face-to-face for discharge.  She will discharge to home on 3/14/2025 with current medications and treatments.  She will do outpatient physical therapy at [facility].   She denied chest pain shortness of breath cough, has congestion and uses Flonase, Recent  right calf venous ultrasound ordered.   And negative for DVT. Of note she is on Xarelto. Last hemoglobin 10.7 on 3/4/25. She will follow up with Orthopedics on Thursday 3/13/25.   Per her report she lives independently with her .  She has agreed to take the oxycodone and reports her pain is much better.      ALLERGIES:Codeine, Alendronate, Hydrocodone, Hydrocodone-acetaminophen, Mold, Morphine, Nsaids, Oxycodone-acetaminophen, Pollen extract, Pravastatin, Short ragweed pollen ext, Simvastatin, Sulfa antibiotics, Adhesive tape, and Conjugated estrogens    PAST MEDICAL HISTORY:   Past Medical History:   Diagnosis Date    Antiplatelet or antithrombotic long-term use     Arthritis     Complication of anesthesia     Difficulty walking     Gastroesophageal reflux disease     Heart disease     Heart murmur     Hypertension     Obese     PONV (postoperative nausea and vomiting)     Sleep disorder 4/26/2010     Thrombosis     Walking troubles        PAST SURGICAL HISTORY:   has a past surgical history that includes Arthroplasty knee (Right, 2/25/2025).    FAMILY HISTORY: family history is not on file.    SOCIAL HISTORY:  reports that she has never smoked. She has never used smokeless tobacco. She reports that she does not currently use alcohol. She reports that she does not currently use drugs.    ROS:  Constitutional: Negative for activity change, appetite change, fatigue and fever.   HENT: Positive for congestion.  On Flonase  Respiratory: Negative for cough, shortness of breath and wheezing.    Cardiovascular: Negative for chest pain and positive leg swelling. Negative US for DVT  Gastrointestinal: Negative for abdominal distention, abdominal pain, constipation, diarrhea and nausea.   Genitourinary: Negative for dysuria.   Musculoskeletal: Negative for arthralgia. Negative for back pain.   Skin: Negative for color change and wound.  Right knee surgical incision with Aquacel dressing  Neurological: Negative for dizziness.   Psychiatric/Behavioral: Negative for agitation, behavioral problems and confusion.     Physical Exam:  Constitutional:       Appearance: Patient is well-developed.   HENT:      Head: Normocephalic.   Eyes:      Conjunctiva/sclera: Conjunctivae normal.   Neck:      Musculoskeletal: Normal range of motion.   Cardiovascular:      Rate and Rhythm: Normal rate and regular rhythm.      Heart sounds: Normal heart sounds. No murmur.   Pulmonary:      Effort: No respiratory distress.      Breath sounds: Normal breath sounds. No wheezing or rales.   Abdominal:      General: Bowel sounds are normal. There is no distension.      Palpations: Abdomen is soft.      Tenderness: There is no abdominal tenderness.   Musculoskeletal:       Normal range of motion.  Right knee surgical incision covered with Aquacel dressing   skin:General:        Skin is warm.   Neurological:         Mental Status: Patient is alert and  "oriented to person, place, and time.   Psychiatric:         Behavior: Behavior normal.     Vitals:/47   Pulse 81   Temp 96.8  F (36  C)   Resp 18   Ht 1.575 m (5' 2\")   Wt 96.6 kg (213 lb)   SpO2 93%   BMI 38.96 kg/m   and Body mass index is 38.96 kg/m .    Lab/Diagnostic data:   Recent Results (from the past 240 hours)   CBC with platelets    Collection Time: 03/04/25  9:01 AM   Result Value Ref Range    WBC Count 8.4 4.0 - 11.0 10e3/uL    RBC Count 3.79 (L) 3.80 - 5.20 10e6/uL    Hemoglobin 10.7 (L) 11.7 - 15.7 g/dL    Hematocrit 34.5 (L) 35.0 - 47.0 %    MCV 91 78 - 100 fL    MCH 28.2 26.5 - 33.0 pg    MCHC 31.0 (L) 31.5 - 36.5 g/dL    RDW 15.3 (H) 10.0 - 15.0 %    Platelet Count 298 150 - 450 10e3/uL   Glucose (OUTREACH)    Collection Time: 03/04/25  9:01 AM   Result Value Ref Range    Glucose 118 (H) 70 - 99 mg/dL   Basic Metabolic Panel No Glucose (OUTREACH)    Collection Time: 03/04/25  9:01 AM   Result Value Ref Range    Sodium 142 135 - 145 mmol/L    Potassium 4.1 3.4 - 5.3 mmol/L    Chloride 104 98 - 107 mmol/L    Carbon Dioxide (CO2) 28 22 - 29 mmol/L    Anion Gap 10 7 - 15 mmol/L    Urea Nitrogen 17.7 8.0 - 23.0 mg/dL    Creatinine 1.02 (H) 0.51 - 0.95 mg/dL    GFR Estimate 55 (L) >60 mL/min/1.73m2    Calcium 10.0 8.8 - 10.4 mg/dL   UA with Microscopic reflex to Culture    Collection Time: 03/08/25  4:00 PM    Specimen: Urine, Clean Catch   Result Value Ref Range    Color Urine Yellow Colorless, Straw, Light Yellow, Yellow    Appearance Urine Clear Clear    Glucose Urine Negative Negative mg/dL    Bilirubin Urine Negative Negative    Ketones Urine Negative Negative mg/dL    Specific Gravity Urine 1.025 1.003 - 1.035    Blood Urine Negative Negative    pH Urine 6.0 5.0 - 7.0    Protein Albumin Urine Negative Negative mg/dL    Urobilinogen Urine 2.0 Normal, 2.0 mg/dL    Nitrite Urine Negative Negative    Leukocyte Esterase Urine Negative Negative    Mucus Urine Present (A) None Seen /LPF    " RBC Urine <1 <=2 /HPF    WBC Urine 4 <=5 /HPF    Squamous Epithelials Urine 3 (H) <=1 /HPF   Glucose (OUTREACH)    Collection Time: 03/11/25  6:19 AM   Result Value Ref Range    Glucose 92 70 - 99 mg/dL   Basic Metabolic Panel No Glucose (OUTREACH)    Collection Time: 03/11/25  6:19 AM   Result Value Ref Range    Sodium 141 135 - 145 mmol/L    Potassium 3.9 3.4 - 5.3 mmol/L    Chloride 103 98 - 107 mmol/L    Carbon Dioxide (CO2) 26 22 - 29 mmol/L    Anion Gap 12 7 - 15 mmol/L    Urea Nitrogen 16.8 8.0 - 23.0 mg/dL    Creatinine 0.98 (H) 0.51 - 0.95 mg/dL    GFR Estimate 58 (L) >60 mL/min/1.73m2    Calcium          MEDICATIONS:  MED REC REQUIRED  Post Medication Reconciliation Status: discharge medications reconciled, continue medications without change          Review of your medicines            Accurate as of March 12, 2025  7:23 PM. If you have any questions, ask your nurse or doctor.                CONTINUE these medicines which have NOT CHANGED        Dose / Directions   acetaminophen 325 MG tablet  Commonly known as: TYLENOL  Used for: Status post joint replacement      Dose: 975 mg  Take 3 tablets (975 mg) by mouth every 6 hours.  Quantity: 100 tablet  Refills: 0     allopurinol 100 MG tablet  Commonly known as: ZYLOPRIM      Dose: 100 mg  Take 100 mg by mouth 2 times daily.  Refills: 0     calcium carbonate-vitamin D 600-10 MG-MCG per tablet  Commonly known as: CALTRATE      Dose: 1 tablet  Take 1 tablet by mouth 2 times daily.  Refills: 0     cefadroxil 500 MG capsule  Commonly known as: DURICEF  Used for: Status post joint replacement      Dose: 500 mg  Take 1 capsule (500 mg) by mouth 2 times daily.  Quantity: 28 capsule  Refills: 0     fluticasone 50 MCG/ACT nasal spray  Commonly known as: FLONASE      Dose: 2 spray  Spray 2 sprays into both nostrils daily.  Refills: 0     gabapentin 100 MG capsule  Commonly known as: NEURONTIN  Used for: Status post joint replacement      Dose: 100 mg  Take 1 capsule  (100 mg) by mouth 2 times daily.  Quantity: 30 capsule  Refills: 0     ketoconazole 2 % external cream  Commonly known as: NIZORAL      Apply topically daily as needed for itching.  Refills: 0     losartan-hydrochlorothiazide 50-12.5 MG tablet  Commonly known as: HYZAAR      Dose: 1 tablet  Take 1 tablet by mouth daily.  Refills: 0     magnesium oxide 400 MG Caps      Dose: 400 mg  Take 400 mg by mouth daily.  Refills: 0     meclizine 25 MG tablet  Commonly known as: ANTIVERT      Dose: 25 mg  Take 25 mg by mouth 3 times daily as needed for dizziness.  Refills: 0     mineral oil-white petrolatum cream      Apply topically daily. And PRN  Refills: 0     Multi-vitamin per tablet  Generic drug: multivitamin w/minerals      Dose: 1 tablet  Take 1 tablet by mouth daily.  Refills: 0     nystatin 556074 UNIT/GM external powder  Commonly known as: MYCOSTATIN      Apply topically 3 times daily as needed.  Refills: 0     olopatadine 0.2 % ophthalmic solution  Commonly known as: PATADAY      Dose: 1 drop  Place 1 drop into both eyes daily as needed.  Refills: 0     omeprazole 20 MG tablet      Dose: 20 mg  Take 20 mg by mouth 2 times daily.  Refills: 0     ondansetron 8 MG tablet  Commonly known as: ZOFRAN      Dose: 8 mg  Take 8 mg by mouth every 8 hours as needed for nausea.  Refills: 0     oxyCODONE 5 MG tablet  Commonly known as: ROXICODONE  Used for: Status post joint replacement      Dose: 5 mg  Take 1 tablet (5 mg) by mouth every 4 hours as needed for severe pain.  Quantity: 26 tablet  Refills: 0     polyethylene glycol 17 GM/Dose powder  Commonly known as: MIRALAX  Used for: Status post joint replacement      Dose: 17 g  Take 17 g by mouth daily.  Quantity: 510 g  Refills: 0     potassium chloride ER 10 MEQ CR capsule  Commonly known as: MICRO-K      Dose: 10 mEq  Take 10 mEq by mouth 3 times daily.  Refills: 0     predniSONE 1 MG tablet  Commonly known as: DELTASONE      Dose: 2 mg  Take 2 mg by mouth daily.  Refills:  0     rivaroxaban ANTICOAGULANT 10 MG Tabs tablet  Commonly known as: XARELTO      Take by mouth daily (with dinner).  Refills: 0     senna-docusate 8.6-50 MG tablet  Commonly known as: SENOKOT-S/PERICOLACE  Used for: Status post joint replacement      Dose: 1 tablet  Take 1 tablet by mouth 2 times daily.  Quantity: 60 tablet  Refills: 0     sodium chloride 0.65 % nasal spray  Commonly known as: OCEAN      Dose: 1 spray  Spray 1 spray into both nostrils every 2 hours as needed for congestion (dryness).  Refills: 0     vitamin B-12 250 MCG tablet  Commonly known as: CYANOCOBALAMIN      Dose: 250 mcg  Take 250 mcg by mouth daily.  Refills: 0     Vitamin D3 25 mcg (1000 units) tablet  Commonly known as: CHOLECALCIFEROL      Dose: 1 tablet  Take 1 tablet by mouth daily.  Refills: 0              ASSESSMENT/PLAN  Encounter Diagnoses   Name Primary?    Physical deconditioning Yes    Pain management     Status post joint replacement      Dry Skin Eucerin cream daily and PRN     Pain management Tylenol every 6 hours, as needed oxycodone    Polyosteoarthritis on prednisone    Anticoagulation management continue Xarelto    Gout on allopurinol    Infection on cefadroxil until 3/14/2025    Neuropathy continue gabapenti    hypertension on losartan    Dizziness as needed meclizine    GERD continue omeprazole    Nausea as needed Zofran    Right total knee arthroplasty follow-up with orthopedics, pain management    Physical deconditioning she will do outpatient physical therapy at [facility]    Right calf pain venous ultrasound ordered and negative for DVT    Lower extremity edema compression on during the day off at night    DISCHARGE PLAN/FACE TO FACE:  I certify that services are/were furnished while this patient was under the care of a physician and that a physician or an allowed non-physician practitioner (NPP), had a face-to-face encounter that meets the physician face-to-face encounter requirements. The encounter was  in whole, or in part, related to the primary reason for home health. The patient is confined to his/her home and needs intermittent skilled nursing, physical therapy, speech-language pathology, or the continued need for occupational therapy. A plan of care has been established by a physician and is periodically reviewed by a physician.  Date of Face-to-Face Encounter: 3/12/25    I certify that, based on my findings, the following services are medically necessary home health services: outpatient  physical therapy     My clinical findings support the need for the above skilled services because: Physical therapy for continued strength and endurance following right  total knee arthroplasty    This patient is homebound because: Not applicable    The patient is, or has been, under my care and I have initiated the establishment of the plan of care. This patient will be followed by a physician who will periodically review the plan of care.    Schedule follow up visit with primary care provider within 7 days to reestablish care.    Electronically signed by: Rosy Zamarripa CNP

## (undated) DEVICE — CUSTOM PACK TOTAL KNEE SOP5BTKHEC

## (undated) DEVICE — HOOD SURG T7PLUS PEEL AWAY FACE SHIELD STRL LF 0416-801-100

## (undated) DEVICE — SUTURE VICRYL+ 0 27IN CT-1 UND VCP260H

## (undated) DEVICE — SUCTION MANIFOLD NEPTUNE 2 SYS 4 PORT 0702-020-000

## (undated) DEVICE — GLOVE BIOGEL PI ORTHOPRO SZ 7.5 47675

## (undated) DEVICE — GOWN IMPERVIOUS BREATHABLE SMART XLG 89045

## (undated) DEVICE — PREP CHLORAPREP W/ORANGE TINT 10.5ML 930715

## (undated) DEVICE — CUSTOM PACK TOTAL KNEE ACCESSORY SOP5BTAHEA

## (undated) DEVICE — SU ETHIBOND 5 V-37 4X30" MB66G

## (undated) DEVICE — ELECTRODE PATIENT RETURN ADULT L10 FT 2 PLATE CORD 0855C

## (undated) DEVICE — SOL NACL 0.9% IRRIG 1000ML BOTTLE 2F7124

## (undated) DEVICE — SUTURE MONOCRYL 3-0 18 PS2 UND MCP497G

## (undated) DEVICE — BLADE SAGITTAL 25MM X 90MM X 1.19MM 6125-119-090

## (undated) DEVICE — BLADE SAW SAGITTAL STRK 18X90X1.27MM HD SYS 6 6118-127-090

## (undated) DEVICE — HOLDER LIMB VELCRO OR 0814-1533

## (undated) DEVICE — SOL WATER IRRIG 1000ML BOTTLE 2F7114

## (undated) DEVICE — SUTURE VICRYL+ 2-0 27IN CT-1 UND VCP259H

## (undated) DEVICE — SU STRATAFIX PDS PLUS 1 CT-1 18" SXPP1A404

## (undated) DEVICE — HOOD SURG T7 PLUS STRL LF DISP 0416-801-200

## (undated) DEVICE — BLADE SAW SAGITTAL STRK 12.5X81.5X1.27MM 4/2000 2108-158-000

## (undated) DEVICE — SOL NACL 0.9% INJ 1000ML BAG 2B1324X

## (undated) DEVICE — VIAL DECANTER STERILE WHITE DYNJDEC06

## (undated) DEVICE — GLOVE BIOGEL PI INDICATOR 8.0 LF 41680

## (undated) RX ORDER — PHENYLEPHRINE HYDROCHLORIDE 10 MG/ML
INJECTION INTRAVENOUS
Status: DISPENSED
Start: 2025-02-25

## (undated) RX ORDER — DEXAMETHASONE SODIUM PHOSPHATE 10 MG/ML
INJECTION, SOLUTION INTRAMUSCULAR; INTRAVENOUS
Status: DISPENSED
Start: 2025-02-25

## (undated) RX ORDER — PROPOFOL 10 MG/ML
INJECTION, EMULSION INTRAVENOUS
Status: DISPENSED
Start: 2025-02-25

## (undated) RX ORDER — EPHEDRINE SULFATE 50 MG/ML
INJECTION, SOLUTION INTRAMUSCULAR; INTRAVENOUS; SUBCUTANEOUS
Status: DISPENSED
Start: 2025-02-25

## (undated) RX ORDER — ONDANSETRON 2 MG/ML
INJECTION INTRAMUSCULAR; INTRAVENOUS
Status: DISPENSED
Start: 2025-02-25

## (undated) RX ORDER — LIDOCAINE HYDROCHLORIDE 10 MG/ML
INJECTION, SOLUTION EPIDURAL; INFILTRATION; INTRACAUDAL; PERINEURAL
Status: DISPENSED
Start: 2025-02-25

## (undated) RX ORDER — GLYCOPYRROLATE 0.2 MG/ML
INJECTION, SOLUTION INTRAMUSCULAR; INTRAVENOUS
Status: DISPENSED
Start: 2025-02-25